# Patient Record
Sex: MALE | Race: WHITE | NOT HISPANIC OR LATINO | Employment: OTHER | URBAN - METROPOLITAN AREA
[De-identification: names, ages, dates, MRNs, and addresses within clinical notes are randomized per-mention and may not be internally consistent; named-entity substitution may affect disease eponyms.]

---

## 2017-03-17 ENCOUNTER — APPOINTMENT (OUTPATIENT)
Dept: PREADMISSION TESTING | Facility: HOSPITAL | Age: 62
End: 2017-03-17

## 2017-03-21 ENCOUNTER — OFFICE VISIT (OUTPATIENT)
Dept: CARDIOLOGY | Facility: CLINIC | Age: 62
End: 2017-03-21

## 2017-03-21 VITALS
HEIGHT: 71 IN | DIASTOLIC BLOOD PRESSURE: 85 MMHG | SYSTOLIC BLOOD PRESSURE: 161 MMHG | WEIGHT: 168.4 LBS | HEART RATE: 82 BPM | BODY MASS INDEX: 23.57 KG/M2

## 2017-03-21 DIAGNOSIS — E78.2 MIXED HYPERLIPIDEMIA: ICD-10-CM

## 2017-03-21 DIAGNOSIS — I10 ESSENTIAL HYPERTENSION: ICD-10-CM

## 2017-03-21 DIAGNOSIS — I25.10 CORONARY ARTERY DISEASE INVOLVING NATIVE CORONARY ARTERY OF NATIVE HEART WITHOUT ANGINA PECTORIS: Primary | ICD-10-CM

## 2017-03-21 PROCEDURE — 93000 ELECTROCARDIOGRAM COMPLETE: CPT | Performed by: INTERNAL MEDICINE

## 2017-03-21 PROCEDURE — 99213 OFFICE O/P EST LOW 20 MIN: CPT | Performed by: INTERNAL MEDICINE

## 2017-03-21 RX ORDER — CLOPIDOGREL BISULFATE 75 MG/1
75 TABLET ORAL DAILY
COMMUNITY
End: 2017-03-21 | Stop reason: SDUPTHER

## 2017-03-21 RX ORDER — CLOPIDOGREL BISULFATE 75 MG/1
75 TABLET ORAL DAILY
Qty: 90 TABLET | Refills: 3 | Status: SHIPPED | OUTPATIENT
Start: 2017-03-21 | End: 2018-04-25 | Stop reason: SDUPTHER

## 2017-03-21 RX ORDER — CARVEDILOL 6.25 MG/1
6.25 TABLET ORAL EVERY 12 HOURS SCHEDULED
Qty: 180 TABLET | Refills: 3 | Status: SHIPPED | OUTPATIENT
Start: 2017-03-21 | End: 2018-06-01 | Stop reason: SDUPTHER

## 2017-03-21 RX ORDER — ATORVASTATIN CALCIUM 40 MG/1
80 TABLET, FILM COATED ORAL NIGHTLY
Qty: 90 TABLET | Refills: 3 | Status: SHIPPED | OUTPATIENT
Start: 2017-03-21 | End: 2017-09-07 | Stop reason: SDUPTHER

## 2017-03-21 NOTE — PROGRESS NOTES
Encounter Date:03/21/2017      Patient ID: Roberto Cobos is a 61 y.o. male.    Chief Complaint: Surgery Clearance    PROBLEM LIST:  1. Coronary artery disease  a. UA following cardiac catheter 8/16/16: 99% stenosis of QUETA branch of RCA with non-occlusive IntraStent of mid RCA.  Total occlusion of circumflex at previous stent.  Mid plaque approximately LAD and artery disease LAD.  EF 60%.  b. PTCA stent of the LAD of RCA with MARI.  c. Cardiac Catheterization 11/27/16: Patent stent in proximal LAD, 50% stenosis at the apical portion, diagnoses are free of disease.  Left circumflex chronically occluded proximally at the level of stent to the OM.  Left collaterals to obtuse marginal.  RCA 40% stenosis.  d. Echocardiogram 11/27/16 Normal diastolic function. EF 70-75%. No valvular problems noted.  2. Hypertension  3. Hyperlipidemia  4. Bladder cancer   a. Hospitalized at [facility] on 11/27/16 for hematuria.  b. Prostate cancer    History of Present Illness  Patient presents today for follow-up. Has been doing well from a cardiac standpoint. Due to undergo bladder surgery. Denies chest pain, shortness of breath, leg swelling, dizzy spells, palpitations, or syncope. Remains busy and active. Continues to smoke cigarettes.    Allergies   Allergen Reactions   • Sulfa Antibiotics Rash         Current Outpatient Prescriptions:   •  albuterol (PROVENTIL HFA;VENTOLIN HFA) 108 (90 BASE) MCG/ACT inhaler, Inhale 2 puffs 4 (four) times a day., Disp: 1 inhaler, Rfl: 11  •  aspirin 81 MG EC tablet, Take 81 mg by mouth Daily., Disp: , Rfl:   •  atorvastatin (LIPITOR) 40 MG tablet, Take 2 tablets by mouth Every Night., Disp: 90 tablet, Rfl: 3  •  carvedilol (COREG) 6.25 MG tablet, Take 1 tablet by mouth Every 12 (Twelve) Hours., Disp: 180 tablet, Rfl: 3  •  clopidogrel (PLAVIX) 75 MG tablet, Take 1 tablet by mouth Daily., Disp: 90 tablet, Rfl: 3  •  finasteride (PROSCAR) 5 MG tablet, Take 5 mg by mouth Daily., Disp: , Rfl:  "  •  HYDROcodone-acetaminophen (NORCO) 5-325 MG per tablet, Take 1 tablet by mouth Every 8 (Eight) Hours As Needed., Disp: , Rfl:   •  ipratropium-albuterol (DUO-NEB) 0.5-2.5 mg/mL nebulizer, Take 3 mL by nebulization 4 (four) times a day., Disp: 360 mL, Rfl: 0  •  lisinopril (PRINIVIL,ZESTRIL) 2.5 MG tablet, Take 2.5 mg by mouth Daily., Disp: , Rfl:   •  mometasone-formoterol (DULERA 100) 100-5 MCG/ACT inhaler, Inhale 2 puffs 2 (Two) Times a Day., Disp: , Rfl:   •  nitroglycerin (NITROSTAT) 0.4 MG SL tablet, Place 0.4 mg under the tongue every 5 (five) minutes as needed for chest pain. Take no more than 3 doses in 15 minutes., Disp: , Rfl:   •  oxybutynin (DITROPAN) 5 MG tablet, Take 5 mg by mouth 2 (Two) Times a Day., Disp: , Rfl:   •  pantoprazole (PROTONIX) 40 MG EC tablet, Take 40 mg by mouth Daily., Disp: , Rfl:   •  rOPINIRole (REQUIP) 0.25 MG tablet, Take 0.25 mg by mouth every night. Take 1 hour before bedtime., Disp: , Rfl:   •  tamsulosin (FLOMAX) 0.4 MG capsule 24 hr capsule, Take 1 capsule by mouth every night., Disp: , Rfl:     The following portions of the patient's history were reviewed and updated as appropriate: allergies, current medications, past family history, past medical history, past social history, past surgical history and problem list.    Review of Systems   Constitution: Negative for chills, fever, weight gain and weight loss.   Cardiovascular: Negative for chest pain, claudication, dyspnea on exertion, leg swelling, orthopnea, palpitations, paroxysmal nocturnal dyspnea and syncope.        No dizziness   Gastrointestinal: Negative for abdominal pain, constipation, diarrhea, nausea and vomiting.   Genitourinary:        No urinary symptoms   Neurological:        No symptoms of stroke.   All other systems reviewed and are negative.    Objective:     Blood pressure 161/85, pulse 82, height 71\" (180.3 cm), weight 168 lb 6.4 oz (76.4 kg).      Physical Exam   Constitutional: He appears " well-developed and well-nourished.   HENT:   HEENT exam unremarkable.   Neck: Neck supple. No JVD present.   No carotid bruits.   Cardiovascular: Normal rate, regular rhythm and normal heart sounds.    No murmur heard.  2 plus symmetric pulses.   Pulmonary/Chest: Breath sounds normal. He exhibits no tenderness.   Abdominal:   Abdomen benign.   Musculoskeletal: He exhibits no edema.   Neurological:   Neurological exam unremarkable.   Vitals reviewed.      ECG 12 Lead  Date/Time: 3/21/2017 11:41 AM  Performed by: LALO JACOME  Authorized by: LALO JACOME   Rhythm: sinus rhythm  Conduction: incomplete RBBB  Clinical impression: non-specific ECG               Assessment:      Diagnosis Plan   1. Coronary artery disease involving native coronary artery of native heart without angina pectoris     2. Essential hypertension     3. Mixed hyperlipidemia       Plan:   Cleared from a cardiac standpoint for his upcoming bladder procedure.  Continue current medications.   FU in 6 MO, sooner as needed.  Thank you for allowing us to participate in the care of your patient.     Lalo Jacome MD [facility]    Please note that portions of this note may have been completed with a voice recognition program. Efforts were made to edit the dictations, but occasionally words are mistranscribed.

## 2017-04-10 ENCOUNTER — APPOINTMENT (OUTPATIENT)
Dept: PREADMISSION TESTING | Facility: HOSPITAL | Age: 62
End: 2017-04-10

## 2017-04-10 VITALS — WEIGHT: 167.25 LBS | HEIGHT: 71 IN | BODY MASS INDEX: 23.41 KG/M2

## 2017-04-10 LAB
DEPRECATED RDW RBC AUTO: 45.2 FL (ref 37–54)
ERYTHROCYTE [DISTWIDTH] IN BLOOD BY AUTOMATED COUNT: 13.1 % (ref 11.3–14.5)
HCT VFR BLD AUTO: 41.9 % (ref 38.9–50.9)
HGB BLD-MCNC: 14 G/DL (ref 13.1–17.5)
MCH RBC QN AUTO: 31.3 PG (ref 27–31)
MCHC RBC AUTO-ENTMCNC: 33.4 G/DL (ref 32–36)
MCV RBC AUTO: 93.5 FL (ref 80–99)
PLATELET # BLD AUTO: 163 10*3/MM3 (ref 150–450)
PMV BLD AUTO: 9.6 FL (ref 6–12)
POTASSIUM BLD-SCNC: 4 MMOL/L (ref 3.5–5.5)
RBC # BLD AUTO: 4.48 10*6/MM3 (ref 4.2–5.76)
WBC NRBC COR # BLD: 6.03 10*3/MM3 (ref 3.5–10.8)

## 2017-04-10 PROCEDURE — 85027 COMPLETE CBC AUTOMATED: CPT | Performed by: ANESTHESIOLOGY

## 2017-04-10 PROCEDURE — 36415 COLL VENOUS BLD VENIPUNCTURE: CPT

## 2017-04-10 PROCEDURE — 84132 ASSAY OF SERUM POTASSIUM: CPT | Performed by: ANESTHESIOLOGY

## 2017-04-10 NOTE — DISCHARGE INSTRUCTIONS
The following information and instructions were given:    NPO after MN except sips of water with routine prescribed medication (except blood thinner, diabetes, or weight reducing medication) unless otherwise instructed by your physician.  Do not eat, drink, smoke or chew gum after MN the night before surgery. This also includes no mints.    DO NOT shave, wear makeup or dark nail polish.    Remove all jewelry (advised to go to jeweler if unable to remove).    Leave anything you consider valuable at home.    Leave your suitcase in the car until after your surgery.    Bring the following with you (if applicable)   -picture ID and insurance cards   -Co-pay/deductible required by insurance   -Medications in the original bottles (not a list) including all over-the-counter  medications if not brought to PAT   -Copy of advance directive, living will or power of  documents if not  brought to PAT   -CPAP or BIPAP mask and tubing (do not bring machine)   -Skin prep instructions sheet   -PAT Pass   Education booklet, brochure, handout or binder given to patient.    Pain Control After Surgery handout given to patient.    Respirex use (handout given to patient) and pneumonia prevention.    Signs and Symptoms of infection.    DVT Prevention stressing the importance of ambulation.

## 2017-04-12 ENCOUNTER — HOSPITAL ENCOUNTER (OUTPATIENT)
Facility: HOSPITAL | Age: 62
Setting detail: HOSPITAL OUTPATIENT SURGERY
Discharge: HOME | End: 2017-04-12
Attending: UROLOGY | Admitting: UROLOGY

## 2017-04-12 ENCOUNTER — ANESTHESIA (OUTPATIENT)
Dept: PERIOP | Facility: HOSPITAL | Age: 62
End: 2017-04-12

## 2017-04-12 ENCOUNTER — ANESTHESIA EVENT (OUTPATIENT)
Dept: PERIOP | Facility: HOSPITAL | Age: 62
End: 2017-04-12

## 2017-04-12 VITALS
HEART RATE: 69 BPM | RESPIRATION RATE: 14 BRPM | WEIGHT: 167 LBS | DIASTOLIC BLOOD PRESSURE: 97 MMHG | BODY MASS INDEX: 23.38 KG/M2 | OXYGEN SATURATION: 98 % | TEMPERATURE: 98 F | HEIGHT: 71 IN | SYSTOLIC BLOOD PRESSURE: 167 MMHG

## 2017-04-12 DIAGNOSIS — C67.9 BLADDER CANCER (HCC): ICD-10-CM

## 2017-04-12 PROCEDURE — 25010000002 FENTANYL CITRATE (PF) 100 MCG/2ML SOLUTION: Performed by: NURSE ANESTHETIST, CERTIFIED REGISTERED

## 2017-04-12 PROCEDURE — 88305 TISSUE EXAM BY PATHOLOGIST: CPT | Performed by: UROLOGY

## 2017-04-12 PROCEDURE — 25010000002 ONDANSETRON PER 1 MG: Performed by: NURSE ANESTHETIST, CERTIFIED REGISTERED

## 2017-04-12 PROCEDURE — 25010000002 DEXAMETHASONE PER 1 MG: Performed by: NURSE ANESTHETIST, CERTIFIED REGISTERED

## 2017-04-12 PROCEDURE — 25010000002 MIDAZOLAM PER 1 MG: Performed by: NURSE ANESTHETIST, CERTIFIED REGISTERED

## 2017-04-12 PROCEDURE — 25010000003 CEFAZOLIN IN DEXTROSE 2-4 GM/100ML-% SOLUTION: Performed by: UROLOGY

## 2017-04-12 PROCEDURE — 25010000002 PROPOFOL 10 MG/ML EMULSION: Performed by: NURSE ANESTHETIST, CERTIFIED REGISTERED

## 2017-04-12 RX ORDER — MIDAZOLAM HYDROCHLORIDE 1 MG/ML
INJECTION INTRAMUSCULAR; INTRAVENOUS AS NEEDED
Status: DISCONTINUED | OUTPATIENT
Start: 2017-04-12 | End: 2017-04-12 | Stop reason: SURG

## 2017-04-12 RX ORDER — CEFAZOLIN SODIUM 2 G/100ML
2 INJECTION, SOLUTION INTRAVENOUS ONCE
Status: COMPLETED | OUTPATIENT
Start: 2017-04-12 | End: 2017-04-12

## 2017-04-12 RX ORDER — SODIUM CHLORIDE, SODIUM LACTATE, POTASSIUM CHLORIDE, CALCIUM CHLORIDE 600; 310; 30; 20 MG/100ML; MG/100ML; MG/100ML; MG/100ML
9 INJECTION, SOLUTION INTRAVENOUS CONTINUOUS PRN
Status: DISCONTINUED | OUTPATIENT
Start: 2017-04-12 | End: 2017-04-12 | Stop reason: HOSPADM

## 2017-04-12 RX ORDER — SODIUM CHLORIDE 0.9 % (FLUSH) 0.9 %
1-10 SYRINGE (ML) INJECTION AS NEEDED
Status: DISCONTINUED | OUTPATIENT
Start: 2017-04-12 | End: 2017-04-12 | Stop reason: HOSPADM

## 2017-04-12 RX ORDER — DEXAMETHASONE SODIUM PHOSPHATE 10 MG/ML
INJECTION INTRAMUSCULAR; INTRAVENOUS AS NEEDED
Status: DISCONTINUED | OUTPATIENT
Start: 2017-04-12 | End: 2017-04-12 | Stop reason: SURG

## 2017-04-12 RX ORDER — HYDROMORPHONE HYDROCHLORIDE 1 MG/ML
0.5 INJECTION, SOLUTION INTRAMUSCULAR; INTRAVENOUS; SUBCUTANEOUS
Status: DISCONTINUED | OUTPATIENT
Start: 2017-04-12 | End: 2017-04-12 | Stop reason: HOSPADM

## 2017-04-12 RX ORDER — LIDOCAINE HYDROCHLORIDE 20 MG/ML
INJECTION, SOLUTION INFILTRATION; PERINEURAL AS NEEDED
Status: DISCONTINUED | OUTPATIENT
Start: 2017-04-12 | End: 2017-04-12 | Stop reason: SURG

## 2017-04-12 RX ORDER — MAGNESIUM HYDROXIDE 1200 MG/15ML
LIQUID ORAL AS NEEDED
Status: DISCONTINUED | OUTPATIENT
Start: 2017-04-12 | End: 2017-04-12 | Stop reason: HOSPADM

## 2017-04-12 RX ORDER — PROPOFOL 10 MG/ML
VIAL (ML) INTRAVENOUS AS NEEDED
Status: DISCONTINUED | OUTPATIENT
Start: 2017-04-12 | End: 2017-04-12 | Stop reason: SURG

## 2017-04-12 RX ORDER — FENTANYL CITRATE 50 UG/ML
50 INJECTION, SOLUTION INTRAMUSCULAR; INTRAVENOUS
Status: DISCONTINUED | OUTPATIENT
Start: 2017-04-12 | End: 2017-04-12 | Stop reason: HOSPADM

## 2017-04-12 RX ORDER — FAMOTIDINE 10 MG/ML
20 INJECTION, SOLUTION INTRAVENOUS
Status: DISCONTINUED | OUTPATIENT
Start: 2017-04-12 | End: 2017-04-12 | Stop reason: HOSPADM

## 2017-04-12 RX ORDER — ONDANSETRON 2 MG/ML
INJECTION INTRAMUSCULAR; INTRAVENOUS AS NEEDED
Status: DISCONTINUED | OUTPATIENT
Start: 2017-04-12 | End: 2017-04-12 | Stop reason: SURG

## 2017-04-12 RX ORDER — ONDANSETRON 2 MG/ML
4 INJECTION INTRAMUSCULAR; INTRAVENOUS ONCE AS NEEDED
Status: DISCONTINUED | OUTPATIENT
Start: 2017-04-12 | End: 2017-04-12 | Stop reason: HOSPADM

## 2017-04-12 RX ORDER — FENTANYL CITRATE 50 UG/ML
INJECTION, SOLUTION INTRAMUSCULAR; INTRAVENOUS AS NEEDED
Status: DISCONTINUED | OUTPATIENT
Start: 2017-04-12 | End: 2017-04-12 | Stop reason: SURG

## 2017-04-12 RX ORDER — LIDOCAINE HYDROCHLORIDE 10 MG/ML
0.5 INJECTION, SOLUTION EPIDURAL; INFILTRATION; INTRACAUDAL; PERINEURAL ONCE AS NEEDED
Status: COMPLETED | OUTPATIENT
Start: 2017-04-12 | End: 2017-04-12

## 2017-04-12 RX ORDER — CEPHALEXIN 250 MG/1
250 CAPSULE ORAL 3 TIMES DAILY
Qty: 15 CAPSULE | Refills: 0 | Status: SHIPPED | OUTPATIENT
Start: 2017-04-12 | End: 2017-04-22

## 2017-04-12 RX ORDER — FAMOTIDINE 20 MG/1
20 TABLET, FILM COATED ORAL
Status: DISCONTINUED | OUTPATIENT
Start: 2017-04-12 | End: 2017-04-12 | Stop reason: HOSPADM

## 2017-04-12 RX ADMIN — FENTANYL CITRATE 25 MCG: 50 INJECTION, SOLUTION INTRAMUSCULAR; INTRAVENOUS at 09:44

## 2017-04-12 RX ADMIN — PROPOFOL 200 MG: 10 INJECTION, EMULSION INTRAVENOUS at 09:24

## 2017-04-12 RX ADMIN — LIDOCAINE HYDROCHLORIDE 50 MG: 20 INJECTION, SOLUTION INFILTRATION; PERINEURAL at 09:23

## 2017-04-12 RX ADMIN — LIDOCAINE HYDROCHLORIDE 0.2 ML: 10 INJECTION, SOLUTION EPIDURAL; INFILTRATION; INTRACAUDAL; PERINEURAL at 08:11

## 2017-04-12 RX ADMIN — ONDANSETRON 4 MG: 2 INJECTION INTRAMUSCULAR; INTRAVENOUS at 09:34

## 2017-04-12 RX ADMIN — SODIUM CHLORIDE, POTASSIUM CHLORIDE, SODIUM LACTATE AND CALCIUM CHLORIDE 9 ML/HR: 600; 310; 30; 20 INJECTION, SOLUTION INTRAVENOUS at 08:11

## 2017-04-12 RX ADMIN — MIDAZOLAM HYDROCHLORIDE 2 MG: 1 INJECTION, SOLUTION INTRAMUSCULAR; INTRAVENOUS at 09:20

## 2017-04-12 RX ADMIN — CEFAZOLIN SODIUM 2 G: 2 INJECTION, SOLUTION INTRAVENOUS at 09:20

## 2017-04-12 RX ADMIN — FAMOTIDINE 20 MG: 20 TABLET ORAL at 08:34

## 2017-04-12 RX ADMIN — FENTANYL CITRATE 25 MCG: 50 INJECTION, SOLUTION INTRAMUSCULAR; INTRAVENOUS at 09:45

## 2017-04-12 RX ADMIN — FENTANYL CITRATE 25 MCG: 50 INJECTION, SOLUTION INTRAMUSCULAR; INTRAVENOUS at 09:43

## 2017-04-12 RX ADMIN — DEXAMETHASONE SODIUM PHOSPHATE 4 MG: 10 INJECTION INTRAMUSCULAR; INTRAVENOUS at 09:30

## 2017-04-12 NOTE — H&P
"[facility] Pre-op    Full history and physical note from office is up to date.  See office note attached.    ROS:  No fever, chills, rashes.  No CP, palps, +cardiac clearance.  No cough, wheeze, SOA    /95 (BP Location: Right arm, Patient Position: Lying)  Pulse 69  Temp 97.4 °F (36.3 °C) (Temporal Artery )   Resp 18  Ht 71\" (180.3 cm)  Wt 167 lb (75.8 kg)  SpO2 98%  BMI 23.29 kg/m2    CV:  S1S2 reg no murmur  Resp:  CTAB    IMM:  Influenza:  Yes 2016  Pneumococcal:  no  Tetanus:  no    Cancer Staging (if applicable)  Cancer Patient: __ yes __no __unknown__N/A; If yes, clinical stage T:__ N:__M:__, stage group or __N/A    Jinny Morris, APRN 4/12/2017 8:42 AM    "

## 2017-04-12 NOTE — ANESTHESIA POSTPROCEDURE EVALUATION
Patient: Roberto Cobos    Procedure Summary     Date Anesthesia Start Anesthesia Stop Room / Location    04/12/17 0920 0954  [facility] / [facility]       Procedure Diagnosis Surgeon Provider    CYSTOSCOPY WITH BLADDER BIOPSY AND FULGURATION (N/A Bladder) No diagnosis on file. MD Peter Mathis MD          Anesthesia Type: general  Last vitals  BP      Temp      Pulse     Resp      SpO2        Post Anesthesia Care and Evaluation    Patient location during evaluation: PACU  Patient participation: complete - patient participated  Level of consciousness: obtunded/minimal responses  Pain score: 0  Pain management: adequate  Airway patency: patent  Anesthetic complications: No anesthetic complications  PONV Status: none  Cardiovascular status: hemodynamically stable and acceptable  Respiratory status: nonlabored ventilation, acceptable and nasal cannula  Hydration status: acceptable

## 2017-04-12 NOTE — BRIEF OP NOTE
CYSTOSCOPY BLADDER BIOPSY  Procedure Note    Roberto Cobos  4/12/2017    Pre-op Diagnosis:    Bladder cancer  Post-op Diagnosis:      Same  Procedure/CPT® Codes:      Procedure(s):  CYSTOSCOPY WITH BLADDER BIOPSY AND FULGURATION    Surgeon(s):  Bernardino Ray MD    Anesthesia: General    Staff:   Circulator: Katya Marinelli RN  Scrub Person: Suyapa Chinchilla RN    Estimated Blood Loss: * No values recorded between 4/12/2017  9:19 AM and 4/12/2017  9:50 AM *  Urine Voided: * No values recorded between 4/12/2017  9:19 AM and 4/12/2017  9:50 AM *    Specimens:                2 small biopsy sent to path  ID Type Source Tests Collected by Time Destination   A :  Tissue Urinary Bladder TISSUE EXAM Bernardino Ray MD 4/12/2017 0955          Drains:     none       Findings: 1+ bladder trabeculation.  A small amount of erythema right bladder base    Complications: None, to recovery room stable      Bernardino Ray MD     Date: 4/12/2017  Time: 9:56 AM

## 2017-04-12 NOTE — ANESTHESIA PREPROCEDURE EVALUATION
Anesthesia Evaluation     Patient summary reviewed and Nursing notes reviewed   no history of anesthetic complications:  NPO Status: > 8 hours   Airway   Mallampati: II  TM distance: >3 FB  Neck ROM: full  no difficulty expected  Dental - normal exam     Pulmonary    (+) a smoker Current, COPD, decreased breath sounds,   Cardiovascular - normal exam  Exercise tolerance: good (4-7 METS)    ECG reviewed  Rhythm: regular  Rate: normal    (+) hypertension well controlled, CAD, cardiac stents Drug eluting stent more than 12 months ago dysrhythmias Atrial Fib, hyperlipidemia      Neuro/Psych- negative ROS  GI/Hepatic/Renal/Endo    (+)  GERD well controlled,     Musculoskeletal     Abdominal  - normal exam  (-) obese    Abdomen: soft.   Substance History      OB/GYN          Other   (+) arthritis                               Anesthesia Plan    ASA 3     general     intravenous induction   Anesthetic plan and risks discussed with patient.    Plan discussed with CRNA.

## 2017-04-12 NOTE — ANESTHESIA PROCEDURE NOTES
Airway  Urgency: elective    Airway not difficult    General Information and Staff    Patient location during procedure: OR    Indications and Patient Condition  Indications for airway management: airway protection    Preoxygenated: yes  Mask difficulty assessment: 1 - vent by mask    Final Airway Details  Final airway type: supraglottic airway      Successful airway: unique  Size 4    Number of attempts at approach: 1

## 2017-04-20 LAB
CYTO UR: NORMAL
LAB AP CASE REPORT: NORMAL
LAB AP CLINICAL INFORMATION: NORMAL
LAB AP DIAGNOSIS COMMENT: NORMAL
Lab: NORMAL
PATH REPORT.FINAL DX SPEC: NORMAL
PATH REPORT.GROSS SPEC: NORMAL

## 2017-06-30 ENCOUNTER — TELEPHONE (OUTPATIENT)
Dept: CARDIOLOGY | Facility: CLINIC | Age: 62
End: 2017-06-30

## 2017-08-25 ENCOUNTER — OFFICE VISIT (OUTPATIENT)
Dept: CARDIOLOGY | Facility: CLINIC | Age: 62
End: 2017-08-25

## 2017-08-25 VITALS
SYSTOLIC BLOOD PRESSURE: 142 MMHG | HEART RATE: 74 BPM | WEIGHT: 162.6 LBS | BODY MASS INDEX: 22.76 KG/M2 | HEIGHT: 71 IN | DIASTOLIC BLOOD PRESSURE: 76 MMHG

## 2017-08-25 DIAGNOSIS — I10 ESSENTIAL HYPERTENSION: ICD-10-CM

## 2017-08-25 DIAGNOSIS — Z72.0 TOBACCO ABUSE: ICD-10-CM

## 2017-08-25 DIAGNOSIS — I25.111 CORONARY ARTERY DISEASE INVOLVING NATIVE CORONARY ARTERY OF NATIVE HEART WITH ANGINA PECTORIS WITH DOCUMENTED SPASM (HCC): Primary | ICD-10-CM

## 2017-08-25 DIAGNOSIS — E78.2 MIXED HYPERLIPIDEMIA: ICD-10-CM

## 2017-08-25 PROCEDURE — 99214 OFFICE O/P EST MOD 30 MIN: CPT | Performed by: INTERNAL MEDICINE

## 2017-08-25 RX ORDER — VARENICLINE TARTRATE 1 MG/1
1 TABLET, FILM COATED ORAL 2 TIMES DAILY
Qty: 60 TABLET | Refills: 6 | Status: SHIPPED | OUTPATIENT
Start: 2017-08-25 | End: 2017-11-06

## 2017-08-25 RX ORDER — ISOSORBIDE MONONITRATE 30 MG/1
30 TABLET, EXTENDED RELEASE ORAL DAILY
Qty: 30 TABLET | Refills: 6 | Status: SHIPPED | OUTPATIENT
Start: 2017-08-25 | End: 2017-10-20 | Stop reason: SINTOL

## 2017-08-25 NOTE — PROGRESS NOTES
Encounter Date:08/25/2017      Patient ID: Roberto Cobos is a 61 y.o. male.    Chief Complaint: Coronary Artery Disease, Cardiac risk factors and Shortness of Breath    PROBLEM LIST:  1. Coronary artery disease  a. UA following cardiac catheter 8/16/16: 99% stenosis of QUETA branch of RCA with non-occlusive IntraStent of mid RCA.  Total occlusion of circumflex at previous stent.  Mid plaque approximately LAD and artery disease LAD.  EF 60%.  b. PTCA stent of the LAD of RCA with MARI.  c. Cardiac Catheterization 11/27/16: Patent stent in proximal LAD, 50% stenosis at the apical portion, diagnoses are free of disease.  Left circumflex chronically occluded proximally at the level of stent to the OM.  Left collaterals to obtuse marginal.  RCA 40% stenosis.  d. Echocardiogram 11/27/16 Normal diastolic function. EF 70-75%. No valvular problems noted.  2. Hypertension  3. Hyperlipidemia  4. Bladder cancer   a. Hospitalized at [facility] on 11/27/16 for hematuria.  5. Prostate cancer    History of Present Illness  Patient presents today for follow-up with a history of CAD and cardiac risk factors. Since last visit he has been experiencing occasional sharp left-sided chest pain which radiates into his left arm.  Also reports an episode of diaphoresis while just standing and talking to his friends which was associated with shortness of breath and subsequently he felt tired for a whole day however did not express any chest pain at that time. He also felt dizzy and sat down in fear that he was about to pass out.  Followed up with PCP and had multiple labs including vitamin levels and thyroid function tests which were all normal.  He is now scheduled for a CT scan to screen for lung cancer.  His bladder cancer is being closely monitored and has been stable. Continues to smoke cigarettes, 1.5 PPD. Desires to cut back, but feels it is very difficult. Drinks Sprite regularly, and admits to not drinking as much water as he  should.  He also has some problems with the cervical disc disease and is scheduled to undergo some procedures, may need eventual surgery and wants to know if he would be cleared.    Allergies   Allergen Reactions   • Sulfa Antibiotics Rash         Current Outpatient Prescriptions:   •  albuterol (PROVENTIL HFA;VENTOLIN HFA) 108 (90 BASE) MCG/ACT inhaler, Inhale 2 puffs 4 (four) times a day., Disp: 1 inhaler, Rfl: 11  •  aspirin 81 MG EC tablet, Take 81 mg by mouth Daily., Disp: , Rfl:   •  atorvastatin (LIPITOR) 40 MG tablet, Take 2 tablets by mouth Every Night., Disp: 90 tablet, Rfl: 3  •  carvedilol (COREG) 6.25 MG tablet, Take 1 tablet by mouth Every 12 (Twelve) Hours., Disp: 180 tablet, Rfl: 3  •  clopidogrel (PLAVIX) 75 MG tablet, Take 1 tablet by mouth Daily. (Patient taking differently: Take 75 mg by mouth Daily. Approval note to hold in epic letters 12/2016), Disp: 90 tablet, Rfl: 3  •  finasteride (PROSCAR) 5 MG tablet, Take 5 mg by mouth Daily., Disp: , Rfl:   •  ipratropium-albuterol (DUO-NEB) 0.5-2.5 mg/mL nebulizer, Take 3 mL by nebulization 4 (four) times a day., Disp: 360 mL, Rfl: 0  •  lisinopril (PRINIVIL,ZESTRIL) 2.5 MG tablet, Take 2.5 mg by mouth Daily., Disp: , Rfl:   •  mometasone-formoterol (DULERA 100) 100-5 MCG/ACT inhaler, Inhale 2 puffs 2 (Two) Times a Day., Disp: , Rfl:   •  nitroglycerin (NITROSTAT) 0.4 MG SL tablet, Place 0.4 mg under the tongue Every 5 (Five) Minutes As Needed for Chest Pain (has not needed). Take no more than 3 doses in 15 minutes. , Disp: , Rfl:   •  rOPINIRole (REQUIP) 0.25 MG tablet, Take 0.25 mg by mouth every night. Take 1 hour before bedtime., Disp: , Rfl:   •  tamsulosin (FLOMAX) 0.4 MG capsule 24 hr capsule, Take 1 capsule by mouth every night., Disp: , Rfl:     The following portions of the patient's history were reviewed and updated as appropriate: allergies, current medications, past family history, past medical history, past social history, past surgical  "history and problem list.    Review of Systems   Constitution: Positive for diaphoresis. Negative for chills, fever, weight gain and weight loss.   Cardiovascular: Positive for chest pain and dyspnea on exertion. Negative for claudication, leg swelling, orthopnea, palpitations, paroxysmal nocturnal dyspnea and syncope.        No dizziness   Respiratory: Positive for shortness of breath.    Gastrointestinal: Negative for abdominal pain, constipation, diarrhea, nausea and vomiting.   Genitourinary:        No urinary symptoms   Neurological: Positive for dizziness and loss of balance.        No symptoms of stroke.   All other systems reviewed and are negative.    Objective:     Blood pressure 142/76, pulse 74, height 71\" (180.3 cm), weight 162 lb 9.6 oz (73.8 kg).      Physical Exam  Constitutional: She appears well-developed and well-nourished.   HENT:   HEENT exam unremarkable.   Neck: Neck supple. No JVD present.   No carotid bruits.   Cardiovascular: Normal rate, regular rhythm and normal heart sounds.    No murmur heard.  2 plus symmetric pulses.   Pulmonary/Chest: Breath sounds normal. Does not exhibit tenderness.   Abdominal:   Abdomen benign.   Musculoskeletal: Does not exhibit edema.   Neurological:   Neurological exam unremarkable.   Vitals reviewed.    Lab Review:   Procedures       Assessment:      Diagnosis Plan   1. Coronary artery disease involving native coronary artery of native heart with Occasional atypical chest pain which appears musculoskeletal versus stable angina.  The episode of diaphoresis and weakness appears consistent with dehydration as there was no chest pain at that time and this has not recurred.   Start Imdur 30 mg once daily to help with chest pain episodes   2. Essential hypertension, well controlled    3. Mixed hyperlipidemia, managed with Lipitor 40.    4. Tobacco abuse, desires to cut back and eventuallly stop completely.  Educated on smoking cessation.       Plan:   Educated on " smoking cessation. Start Chantix 1 mg twice daily.  Start Imdur 30 mg to be taken once daily.  Continue All other current medications.   Advised to drink more water and maintain a healthy lifestyle.  When it comes time for surgery we will reassess his condition at that time to see if he is cleared.  FU in 3 MO, sooner as needed.  Thank you for allowing us to participate in the care of your patient.     Scribed for Amarilis Jacome MD by Jeremiah Crum. 8/25/2017  11:07 AM    I, Amarilis Jacome MD, personally performed the services described in this documentation as scribed by the above named individual in my presence, and it is both accurate and complete.  8/25/2017  12:38 PM        Please note that portions of this note may have been completed with a voice recognition program. Efforts were made to edit the dictations, but occasionally words are mistranscribed.

## 2017-08-29 ENCOUNTER — TELEPHONE (OUTPATIENT)
Dept: CARDIOLOGY | Facility: CLINIC | Age: 62
End: 2017-08-29

## 2017-09-07 RX ORDER — ATORVASTATIN CALCIUM 80 MG/1
80 TABLET, FILM COATED ORAL NIGHTLY
Qty: 90 TABLET | Refills: 1 | Status: SHIPPED | OUTPATIENT
Start: 2017-09-07 | End: 2022-02-22 | Stop reason: ALTCHOICE

## 2017-10-17 ENCOUNTER — TELEPHONE (OUTPATIENT)
Dept: CARDIOLOGY | Facility: CLINIC | Age: 62
End: 2017-10-17

## 2017-10-20 RX ORDER — RANOLAZINE 500 MG/1
500 TABLET, EXTENDED RELEASE ORAL 2 TIMES DAILY
Qty: 60 TABLET | Refills: 5 | Status: SHIPPED | OUTPATIENT
Start: 2017-10-20 | End: 2018-01-26 | Stop reason: SDUPTHER

## 2017-11-06 ENCOUNTER — APPOINTMENT (OUTPATIENT)
Dept: PREADMISSION TESTING | Facility: HOSPITAL | Age: 62
End: 2017-11-06

## 2017-11-06 VITALS — HEIGHT: 71 IN | WEIGHT: 164.46 LBS | BODY MASS INDEX: 23.02 KG/M2

## 2017-11-06 LAB
ANION GAP SERPL CALCULATED.3IONS-SCNC: 4 MMOL/L (ref 3–11)
BUN BLD-MCNC: 19 MG/DL (ref 9–23)
BUN/CREAT SERPL: 15.8 (ref 7–25)
CALCIUM SPEC-SCNC: 9 MG/DL (ref 8.7–10.4)
CHLORIDE SERPL-SCNC: 108 MMOL/L (ref 99–109)
CO2 SERPL-SCNC: 29 MMOL/L (ref 20–31)
CREAT BLD-MCNC: 1.2 MG/DL (ref 0.6–1.3)
DEPRECATED RDW RBC AUTO: 44 FL (ref 37–54)
ERYTHROCYTE [DISTWIDTH] IN BLOOD BY AUTOMATED COUNT: 13.2 % (ref 11.3–14.5)
GFR SERPL CREATININE-BSD FRML MDRD: 61 ML/MIN/1.73
GLUCOSE BLD-MCNC: 148 MG/DL (ref 70–100)
HCT VFR BLD AUTO: 39.4 % (ref 38.9–50.9)
HGB BLD-MCNC: 13.4 G/DL (ref 13.1–17.5)
MCH RBC QN AUTO: 31.1 PG (ref 27–31)
MCHC RBC AUTO-ENTMCNC: 34 G/DL (ref 32–36)
MCV RBC AUTO: 91.4 FL (ref 80–99)
PLATELET # BLD AUTO: 136 10*3/MM3 (ref 150–450)
PMV BLD AUTO: 9.6 FL (ref 6–12)
POTASSIUM BLD-SCNC: 4 MMOL/L (ref 3.5–5.5)
RBC # BLD AUTO: 4.31 10*6/MM3 (ref 4.2–5.76)
SODIUM BLD-SCNC: 141 MMOL/L (ref 132–146)
WBC NRBC COR # BLD: 5.28 10*3/MM3 (ref 3.5–10.8)

## 2017-11-06 PROCEDURE — 93005 ELECTROCARDIOGRAM TRACING: CPT

## 2017-11-06 PROCEDURE — 85027 COMPLETE CBC AUTOMATED: CPT | Performed by: UROLOGY

## 2017-11-06 PROCEDURE — 93010 ELECTROCARDIOGRAM REPORT: CPT | Performed by: INTERNAL MEDICINE

## 2017-11-06 PROCEDURE — 36415 COLL VENOUS BLD VENIPUNCTURE: CPT

## 2017-11-06 PROCEDURE — 80048 BASIC METABOLIC PNL TOTAL CA: CPT | Performed by: UROLOGY

## 2017-11-06 NOTE — PAT
plt (136) count called to claudia at Dr cantu office, states will want redraw of cbc for morning labs. Placing redraw order at this time.

## 2017-11-08 ENCOUNTER — ANESTHESIA (OUTPATIENT)
Dept: PERIOP | Facility: HOSPITAL | Age: 62
End: 2017-11-08

## 2017-11-08 ENCOUNTER — HOSPITAL ENCOUNTER (OUTPATIENT)
Facility: HOSPITAL | Age: 62
Discharge: HOME | End: 2017-11-09
Attending: UROLOGY | Admitting: UROLOGY

## 2017-11-08 ENCOUNTER — ANESTHESIA EVENT (OUTPATIENT)
Dept: PERIOP | Facility: HOSPITAL | Age: 62
End: 2017-11-08

## 2017-11-08 DIAGNOSIS — C67.9 BLADDER CANCER (HCC): ICD-10-CM

## 2017-11-08 LAB
BASOPHILS # BLD AUTO: 0.04 10*3/MM3 (ref 0–0.2)
BASOPHILS NFR BLD AUTO: 0.7 % (ref 0–1)
DEPRECATED RDW RBC AUTO: 45.8 FL (ref 37–54)
EOSINOPHIL # BLD AUTO: 0.24 10*3/MM3 (ref 0–0.3)
EOSINOPHIL NFR BLD AUTO: 4.2 % (ref 0–3)
ERYTHROCYTE [DISTWIDTH] IN BLOOD BY AUTOMATED COUNT: 13.7 % (ref 11.3–14.5)
HCT VFR BLD AUTO: 40.8 % (ref 38.9–50.9)
HGB BLD-MCNC: 13.9 G/DL (ref 13.1–17.5)
IMM GRANULOCYTES # BLD: 0.01 10*3/MM3 (ref 0–0.03)
IMM GRANULOCYTES NFR BLD: 0.2 % (ref 0–0.6)
LYMPHOCYTES # BLD AUTO: 1.25 10*3/MM3 (ref 0.6–4.8)
LYMPHOCYTES NFR BLD AUTO: 21.9 % (ref 24–44)
MCH RBC QN AUTO: 31.2 PG (ref 27–31)
MCHC RBC AUTO-ENTMCNC: 34.1 G/DL (ref 32–36)
MCV RBC AUTO: 91.7 FL (ref 80–99)
MONOCYTES # BLD AUTO: 0.54 10*3/MM3 (ref 0–1)
MONOCYTES NFR BLD AUTO: 9.5 % (ref 0–12)
NEUTROPHILS # BLD AUTO: 3.62 10*3/MM3 (ref 1.5–8.3)
NEUTROPHILS NFR BLD AUTO: 63.5 % (ref 41–71)
PLATELET # BLD AUTO: 139 10*3/MM3 (ref 150–450)
PMV BLD AUTO: 9.9 FL (ref 6–12)
RBC # BLD AUTO: 4.45 10*6/MM3 (ref 4.2–5.76)
WBC NRBC COR # BLD: 5.7 10*3/MM3 (ref 3.5–10.8)

## 2017-11-08 PROCEDURE — 25010000002 DEXAMETHASONE PER 1 MG: Performed by: NURSE ANESTHETIST, CERTIFIED REGISTERED

## 2017-11-08 PROCEDURE — 25010000002 PROPOFOL 10 MG/ML EMULSION: Performed by: NURSE ANESTHETIST, CERTIFIED REGISTERED

## 2017-11-08 PROCEDURE — 25010000002 FENTANYL CITRATE (PF) 100 MCG/2ML SOLUTION: Performed by: NURSE ANESTHETIST, CERTIFIED REGISTERED

## 2017-11-08 PROCEDURE — 25010000002 HYDROMORPHONE PER 4 MG: Performed by: NURSE ANESTHETIST, CERTIFIED REGISTERED

## 2017-11-08 PROCEDURE — 25010000002 PHENYLEPHRINE PER 1 ML: Performed by: NURSE ANESTHETIST, CERTIFIED REGISTERED

## 2017-11-08 PROCEDURE — 25010000002 ONDANSETRON PER 1 MG: Performed by: NURSE ANESTHETIST, CERTIFIED REGISTERED

## 2017-11-08 PROCEDURE — 25010000003 CEFAZOLIN IN DEXTROSE 2-4 GM/100ML-% SOLUTION: Performed by: UROLOGY

## 2017-11-08 PROCEDURE — G0378 HOSPITAL OBSERVATION PER HR: HCPCS

## 2017-11-08 PROCEDURE — 94799 UNLISTED PULMONARY SVC/PX: CPT

## 2017-11-08 PROCEDURE — 85025 COMPLETE CBC W/AUTO DIFF WBC: CPT | Performed by: UROLOGY

## 2017-11-08 PROCEDURE — 88307 TISSUE EXAM BY PATHOLOGIST: CPT | Performed by: UROLOGY

## 2017-11-08 PROCEDURE — 25010000002 PROMETHAZINE PER 50 MG: Performed by: ANESTHESIOLOGY

## 2017-11-08 PROCEDURE — 94640 AIRWAY INHALATION TREATMENT: CPT

## 2017-11-08 RX ORDER — ONDANSETRON 2 MG/ML
INJECTION INTRAMUSCULAR; INTRAVENOUS AS NEEDED
Status: DISCONTINUED | OUTPATIENT
Start: 2017-11-08 | End: 2017-11-08 | Stop reason: SURG

## 2017-11-08 RX ORDER — SODIUM CHLORIDE 9 MG/ML
150 INJECTION, SOLUTION INTRAVENOUS CONTINUOUS
Status: DISCONTINUED | OUTPATIENT
Start: 2017-11-08 | End: 2017-11-09 | Stop reason: HOSPADM

## 2017-11-08 RX ORDER — ONDANSETRON 2 MG/ML
4 INJECTION INTRAMUSCULAR; INTRAVENOUS ONCE AS NEEDED
Status: COMPLETED | OUTPATIENT
Start: 2017-11-08 | End: 2017-11-08

## 2017-11-08 RX ORDER — RANOLAZINE 500 MG/1
500 TABLET, EXTENDED RELEASE ORAL EVERY 12 HOURS SCHEDULED
Status: DISCONTINUED | OUTPATIENT
Start: 2017-11-08 | End: 2017-11-09 | Stop reason: HOSPADM

## 2017-11-08 RX ORDER — LIDOCAINE HYDROCHLORIDE 10 MG/ML
0.5 INJECTION, SOLUTION EPIDURAL; INFILTRATION; INTRACAUDAL; PERINEURAL ONCE AS NEEDED
Status: DISCONTINUED | OUTPATIENT
Start: 2017-11-08 | End: 2017-11-08 | Stop reason: HOSPADM

## 2017-11-08 RX ORDER — FAMOTIDINE 20 MG/1
20 TABLET, FILM COATED ORAL ONCE
Status: DISCONTINUED | OUTPATIENT
Start: 2017-11-08 | End: 2017-11-08 | Stop reason: HOSPADM

## 2017-11-08 RX ORDER — DEXAMETHASONE SODIUM PHOSPHATE 4 MG/ML
INJECTION, SOLUTION INTRA-ARTICULAR; INTRALESIONAL; INTRAMUSCULAR; INTRAVENOUS; SOFT TISSUE AS NEEDED
Status: DISCONTINUED | OUTPATIENT
Start: 2017-11-08 | End: 2017-11-08 | Stop reason: SURG

## 2017-11-08 RX ORDER — MAGNESIUM HYDROXIDE 1200 MG/15ML
LIQUID ORAL AS NEEDED
Status: DISCONTINUED | OUTPATIENT
Start: 2017-11-08 | End: 2017-11-08 | Stop reason: HOSPADM

## 2017-11-08 RX ORDER — ASPIRIN 81 MG/1
81 TABLET ORAL DAILY
Status: DISCONTINUED | OUTPATIENT
Start: 2017-11-09 | End: 2017-11-09 | Stop reason: HOSPADM

## 2017-11-08 RX ORDER — ROPINIROLE 0.5 MG/1
0.25 TABLET, FILM COATED ORAL NIGHTLY
Status: DISCONTINUED | OUTPATIENT
Start: 2017-11-08 | End: 2017-11-09 | Stop reason: HOSPADM

## 2017-11-08 RX ORDER — IPRATROPIUM BROMIDE AND ALBUTEROL SULFATE 2.5; .5 MG/3ML; MG/3ML
3 SOLUTION RESPIRATORY (INHALATION)
Status: DISCONTINUED | OUTPATIENT
Start: 2017-11-08 | End: 2017-11-09 | Stop reason: HOSPADM

## 2017-11-08 RX ORDER — LISINOPRIL 2.5 MG/1
2.5 TABLET ORAL DAILY
Status: DISCONTINUED | OUTPATIENT
Start: 2017-11-09 | End: 2017-11-09 | Stop reason: HOSPADM

## 2017-11-08 RX ORDER — ATORVASTATIN CALCIUM 40 MG/1
80 TABLET, FILM COATED ORAL NIGHTLY
Status: DISCONTINUED | OUTPATIENT
Start: 2017-11-08 | End: 2017-11-09 | Stop reason: HOSPADM

## 2017-11-08 RX ORDER — LABETALOL HYDROCHLORIDE 5 MG/ML
5 INJECTION, SOLUTION INTRAVENOUS
Status: DISCONTINUED | OUTPATIENT
Start: 2017-11-08 | End: 2017-11-08

## 2017-11-08 RX ORDER — FINASTERIDE 5 MG/1
5 TABLET, FILM COATED ORAL DAILY
Status: DISCONTINUED | OUTPATIENT
Start: 2017-11-09 | End: 2017-11-09 | Stop reason: HOSPADM

## 2017-11-08 RX ORDER — CARVEDILOL 6.25 MG/1
6.25 TABLET ORAL EVERY 12 HOURS SCHEDULED
Status: DISCONTINUED | OUTPATIENT
Start: 2017-11-08 | End: 2017-11-09 | Stop reason: HOSPADM

## 2017-11-08 RX ORDER — HYDROCODONE BITARTRATE AND ACETAMINOPHEN 5; 325 MG/1; MG/1
1 TABLET ORAL EVERY 6 HOURS PRN
Status: DISCONTINUED | OUTPATIENT
Start: 2017-11-08 | End: 2017-11-08

## 2017-11-08 RX ORDER — EPHEDRINE SULFATE 50 MG/ML
5 INJECTION, SOLUTION INTRAVENOUS ONCE AS NEEDED
Status: DISCONTINUED | OUTPATIENT
Start: 2017-11-08 | End: 2017-11-08

## 2017-11-08 RX ORDER — HYDROCODONE BITARTRATE AND ACETAMINOPHEN 5; 325 MG/1; MG/1
1 TABLET ORAL EVERY 6 HOURS PRN
Status: DISCONTINUED | OUTPATIENT
Start: 2017-11-08 | End: 2017-11-09 | Stop reason: HOSPADM

## 2017-11-08 RX ORDER — FAMOTIDINE 10 MG/ML
20 INJECTION, SOLUTION INTRAVENOUS ONCE
Status: DISCONTINUED | OUTPATIENT
Start: 2017-11-08 | End: 2017-11-08 | Stop reason: HOSPADM

## 2017-11-08 RX ORDER — HYDROMORPHONE HYDROCHLORIDE 1 MG/ML
0.5 INJECTION, SOLUTION INTRAMUSCULAR; INTRAVENOUS; SUBCUTANEOUS
Status: COMPLETED | OUTPATIENT
Start: 2017-11-08 | End: 2017-11-08

## 2017-11-08 RX ORDER — NITROGLYCERIN 0.4 MG/1
0.4 TABLET SUBLINGUAL
Status: DISCONTINUED | OUTPATIENT
Start: 2017-11-08 | End: 2017-11-09 | Stop reason: HOSPADM

## 2017-11-08 RX ORDER — PROPOFOL 10 MG/ML
VIAL (ML) INTRAVENOUS AS NEEDED
Status: DISCONTINUED | OUTPATIENT
Start: 2017-11-08 | End: 2017-11-08 | Stop reason: SURG

## 2017-11-08 RX ORDER — SODIUM CHLORIDE, SODIUM LACTATE, POTASSIUM CHLORIDE, CALCIUM CHLORIDE 600; 310; 30; 20 MG/100ML; MG/100ML; MG/100ML; MG/100ML
9 INJECTION, SOLUTION INTRAVENOUS CONTINUOUS
Status: DISCONTINUED | OUTPATIENT
Start: 2017-11-08 | End: 2017-11-09 | Stop reason: HOSPADM

## 2017-11-08 RX ORDER — ROCURONIUM BROMIDE 10 MG/ML
INJECTION, SOLUTION INTRAVENOUS AS NEEDED
Status: DISCONTINUED | OUTPATIENT
Start: 2017-11-08 | End: 2017-11-08 | Stop reason: SURG

## 2017-11-08 RX ORDER — PROMETHAZINE HYDROCHLORIDE 25 MG/ML
6.25 INJECTION, SOLUTION INTRAMUSCULAR; INTRAVENOUS ONCE
Status: COMPLETED | OUTPATIENT
Start: 2017-11-08 | End: 2017-11-08

## 2017-11-08 RX ORDER — SODIUM CHLORIDE 0.9 % (FLUSH) 0.9 %
1-10 SYRINGE (ML) INJECTION AS NEEDED
Status: DISCONTINUED | OUTPATIENT
Start: 2017-11-08 | End: 2017-11-08 | Stop reason: HOSPADM

## 2017-11-08 RX ORDER — SODIUM CHLORIDE, SODIUM LACTATE, POTASSIUM CHLORIDE, CALCIUM CHLORIDE 600; 310; 30; 20 MG/100ML; MG/100ML; MG/100ML; MG/100ML
150 INJECTION, SOLUTION INTRAVENOUS CONTINUOUS
Status: DISCONTINUED | OUTPATIENT
Start: 2017-11-08 | End: 2017-11-09 | Stop reason: HOSPADM

## 2017-11-08 RX ORDER — LIDOCAINE HYDROCHLORIDE 10 MG/ML
INJECTION, SOLUTION EPIDURAL; INFILTRATION; INTRACAUDAL; PERINEURAL AS NEEDED
Status: DISCONTINUED | OUTPATIENT
Start: 2017-11-08 | End: 2017-11-08 | Stop reason: SURG

## 2017-11-08 RX ORDER — CEPHALEXIN 250 MG/1
250 CAPSULE ORAL 3 TIMES DAILY
Qty: 15 CAPSULE | Refills: 0 | Status: SHIPPED | OUTPATIENT
Start: 2017-11-08 | End: 2017-11-09

## 2017-11-08 RX ORDER — TAMSULOSIN HYDROCHLORIDE 0.4 MG/1
0.4 CAPSULE ORAL NIGHTLY
Status: DISCONTINUED | OUTPATIENT
Start: 2017-11-08 | End: 2017-11-09 | Stop reason: HOSPADM

## 2017-11-08 RX ORDER — HYDROCODONE BITARTRATE AND ACETAMINOPHEN 5; 325 MG/1; MG/1
1 TABLET ORAL EVERY 6 HOURS PRN
Qty: 20 TABLET | Refills: 0 | Status: SHIPPED | OUTPATIENT
Start: 2017-11-08 | End: 2017-11-09

## 2017-11-08 RX ORDER — CEFAZOLIN SODIUM 2 G/100ML
2 INJECTION, SOLUTION INTRAVENOUS ONCE
Status: COMPLETED | OUTPATIENT
Start: 2017-11-08 | End: 2017-11-08

## 2017-11-08 RX ORDER — FENTANYL CITRATE 50 UG/ML
INJECTION, SOLUTION INTRAMUSCULAR; INTRAVENOUS AS NEEDED
Status: DISCONTINUED | OUTPATIENT
Start: 2017-11-08 | End: 2017-11-08 | Stop reason: SURG

## 2017-11-08 RX ORDER — CEPHALEXIN 250 MG/1
250 CAPSULE ORAL EVERY 8 HOURS SCHEDULED
Status: DISCONTINUED | OUTPATIENT
Start: 2017-11-08 | End: 2017-11-09 | Stop reason: HOSPADM

## 2017-11-08 RX ORDER — FENTANYL CITRATE 50 UG/ML
25 INJECTION, SOLUTION INTRAMUSCULAR; INTRAVENOUS
Status: DISCONTINUED | OUTPATIENT
Start: 2017-11-08 | End: 2017-11-08

## 2017-11-08 RX ADMIN — HYDROCODONE BITARTRATE AND ACETAMINOPHEN 1 TABLET: 5; 325 TABLET ORAL at 18:41

## 2017-11-08 RX ADMIN — FENTANYL CITRATE 50 MCG: 50 INJECTION INTRAMUSCULAR; INTRAVENOUS at 10:49

## 2017-11-08 RX ADMIN — TAMSULOSIN HYDROCHLORIDE 0.4 MG: 0.4 CAPSULE ORAL at 20:19

## 2017-11-08 RX ADMIN — PHENYLEPHRINE HYDROCHLORIDE 100 MCG: 10 INJECTION INTRAVENOUS at 10:14

## 2017-11-08 RX ADMIN — ONDANSETRON 4 MG: 2 INJECTION INTRAMUSCULAR; INTRAVENOUS at 11:52

## 2017-11-08 RX ADMIN — DEXAMETHASONE SODIUM PHOSPHATE 8 MG: 4 INJECTION, SOLUTION INTRAMUSCULAR; INTRAVENOUS at 09:45

## 2017-11-08 RX ADMIN — CARVEDILOL 6.25 MG: 6.25 TABLET, FILM COATED ORAL at 20:19

## 2017-11-08 RX ADMIN — PROPOFOL 200 MG: 10 INJECTION, EMULSION INTRAVENOUS at 09:32

## 2017-11-08 RX ADMIN — ROPINIROLE 0.25 MG: 0.5 TABLET, FILM COATED ORAL at 20:18

## 2017-11-08 RX ADMIN — PHENYLEPHRINE HYDROCHLORIDE 100 MCG: 10 INJECTION INTRAVENOUS at 10:03

## 2017-11-08 RX ADMIN — CEFAZOLIN SODIUM 2 G: 2 INJECTION, SOLUTION INTRAVENOUS at 09:30

## 2017-11-08 RX ADMIN — HYDROMORPHONE HYDROCHLORIDE 0.5 MG: 1 INJECTION, SOLUTION INTRAMUSCULAR; INTRAVENOUS; SUBCUTANEOUS at 10:58

## 2017-11-08 RX ADMIN — PHENYLEPHRINE HYDROCHLORIDE 200 MCG: 10 INJECTION INTRAVENOUS at 09:55

## 2017-11-08 RX ADMIN — RANOLAZINE 500 MG: 500 TABLET, FILM COATED, EXTENDED RELEASE ORAL at 20:18

## 2017-11-08 RX ADMIN — FENTANYL CITRATE 100 MCG: 50 INJECTION, SOLUTION INTRAMUSCULAR; INTRAVENOUS at 09:32

## 2017-11-08 RX ADMIN — SODIUM CHLORIDE 150 ML/HR: 9 INJECTION, SOLUTION INTRAVENOUS at 18:41

## 2017-11-08 RX ADMIN — HYDROMORPHONE HYDROCHLORIDE 0.5 MG: 1 INJECTION, SOLUTION INTRAMUSCULAR; INTRAVENOUS; SUBCUTANEOUS at 11:05

## 2017-11-08 RX ADMIN — IPRATROPIUM BROMIDE AND ALBUTEROL SULFATE 3 ML: .5; 3 SOLUTION RESPIRATORY (INHALATION) at 20:26

## 2017-11-08 RX ADMIN — SUGAMMADEX 200 MG: 100 INJECTION, SOLUTION INTRAVENOUS at 10:21

## 2017-11-08 RX ADMIN — LIDOCAINE HYDROCHLORIDE 50 MG: 10 INJECTION, SOLUTION EPIDURAL; INFILTRATION; INTRACAUDAL; PERINEURAL at 09:32

## 2017-11-08 RX ADMIN — PROMETHAZINE HYDROCHLORIDE 3 MG: 25 INJECTION INTRAMUSCULAR; INTRAVENOUS at 13:08

## 2017-11-08 RX ADMIN — FENTANYL CITRATE 25 MCG: 50 INJECTION INTRAMUSCULAR; INTRAVENOUS at 10:40

## 2017-11-08 RX ADMIN — ATORVASTATIN CALCIUM 80 MG: 40 TABLET, FILM COATED ORAL at 20:18

## 2017-11-08 RX ADMIN — SODIUM CHLORIDE, POTASSIUM CHLORIDE, SODIUM LACTATE AND CALCIUM CHLORIDE: 600; 310; 30; 20 INJECTION, SOLUTION INTRAVENOUS at 09:29

## 2017-11-08 RX ADMIN — ONDANSETRON 4 MG: 2 INJECTION INTRAMUSCULAR; INTRAVENOUS at 10:20

## 2017-11-08 RX ADMIN — HYDROMORPHONE HYDROCHLORIDE 0.5 MG: 1 INJECTION, SOLUTION INTRAMUSCULAR; INTRAVENOUS; SUBCUTANEOUS at 11:15

## 2017-11-08 RX ADMIN — ROCURONIUM BROMIDE 50 MG: 10 INJECTION INTRAVENOUS at 09:32

## 2017-11-08 RX ADMIN — FENTANYL CITRATE 25 MCG: 50 INJECTION INTRAMUSCULAR; INTRAVENOUS at 10:43

## 2017-11-08 RX ADMIN — SODIUM CHLORIDE, POTASSIUM CHLORIDE, SODIUM LACTATE AND CALCIUM CHLORIDE 150 ML/HR: 600; 310; 30; 20 INJECTION, SOLUTION INTRAVENOUS at 20:43

## 2017-11-08 RX ADMIN — PHENYLEPHRINE HYDROCHLORIDE 100 MCG: 10 INJECTION INTRAVENOUS at 09:48

## 2017-11-08 RX ADMIN — PHENYLEPHRINE HYDROCHLORIDE 200 MCG: 10 INJECTION INTRAVENOUS at 09:51

## 2017-11-08 RX ADMIN — HYDROMORPHONE HYDROCHLORIDE 0.5 MG: 1 INJECTION, SOLUTION INTRAMUSCULAR; INTRAVENOUS; SUBCUTANEOUS at 10:46

## 2017-11-08 NOTE — ANESTHESIA POSTPROCEDURE EVALUATION
Patient: Roberto Cobos    Procedure Summary     Date Anesthesia Start Anesthesia Stop Room / Location    11/08/17 0929   [facility] / [facility]       Procedure Diagnosis Surgeon Provider    CYSTOSCOPY ,TRANSURETHRAL RESECTION OF BLADDER TUMOR AND FULGERATION (N/A Bladder) No diagnosis on file. MD Delta Mathis MD          Anesthesia Type: general  Last vitals  BP   159/98   Temp   97.5   Pulse   90   Resp   20   SpO2   98     Post Anesthesia Care and Evaluation    Patient location during evaluation: PACU  Patient participation: complete - patient participated  Level of consciousness: awake and alert  Pain score: 6  Pain management: adequate  Airway patency: patent  Anesthetic complications: No anesthetic complications  PONV Status: none  Cardiovascular status: hemodynamically stable and acceptable  Respiratory status: nonlabored ventilation, acceptable and nasal cannula  Hydration status: acceptable

## 2017-11-08 NOTE — H&P
Pre-Op H&P (See Recent Office Note scanned from 10/16/17)    Chief complaint: Bladder Cancer    HPI:      Patient is a 62 y.o. male who presents with malignant bladder tumor and here today for cystoscopy with possible TURBT    Review of Systems:  General ROS:  no fever, chills, rashes, No change since last office visit  Cardiovascular ROS: no chest pain or dyspnea on exertion.  +cardiac clearance  Respiratory ROS: no cough, shortness of breath, or wheezing    Immunization History:   Influenza:  no  Pneumococcal:  no  Tetanus:  unknown    Vital Signs:  /92 (BP Location: Right arm, Patient Position: Lying)  Pulse 72  Temp 97.4 °F (36.3 °C) (Temporal Artery )   Resp 18  SpO2 98%    Physical Exam:    CV:  S1S2 regular rate and rhythm, no murmur               Resp:  Clear to auscultation; respirations regular, even and unlabored    Results Review:    I reviewed the patient's new clinical results.    Cancer Staging (if applicable)  Cancer Patient: _x_ yes __no __unknown; If yes, clinical stage T:__ N:__M:__, stage group or __N/A    Assessment/Plan:  Malignant bladder tumor - Cystoscopy with possible transurethral resection of bladder tumor    Jinny Morris, APRN  11/8/2017   8:47 AM

## 2017-11-08 NOTE — ANESTHESIA PREPROCEDURE EVALUATION
Anesthesia Evaluation     Patient summary reviewed and Nursing notes reviewed   NPO Solid Status: > 8 hours  NPO Liquid Status: > 8 hours     Airway   Mallampati: II  TM distance: <3 FB  Neck ROM: full  Dental    (+) upper dentures    Pulmonary - normal exam   Cardiovascular   Exercise tolerance: good (4-7 METS)    Rhythm: regular  Rate: normal        Neuro/Psych  GI/Hepatic/Renal/Endo      Musculoskeletal     Abdominal    Substance History      OB/GYN          Other                                        Anesthesia Plan    ASA 2     general     intravenous induction   Anesthetic plan and risks discussed with patient.

## 2017-11-08 NOTE — PLAN OF CARE
Problem: Perioperative Period (Adult)  Goal: Signs and Symptoms of Listed Potential Problems Will be Absent or Manageable (Perioperative Period)  Outcome: Ongoing (interventions implemented as appropriate)    11/08/17 2745   Perioperative Period   Problems Assessed (Perioperative Period) urinary retention   Problems Present (Perioperative Period) urinary retention

## 2017-11-08 NOTE — OP NOTE
Pre-op diagnosis: Bladder cancer   postoperative diagnosis: Same  Procedure performed: Cystoscopy with transurethral resection of multiple bladder tumors (15) and fulguration   Anesthesia: Gen.  Surgeon: Flor  Complications: None  Drains: None  Indications: This is a 62-year-old white male with a history of bladder cancer dating back to 2007.  He had resections done in 07 in 2010 in 2013 and 2016 and then some fulguration and April 2017.  Recently he had a CT scan showed multiple small lesions in the bladder.  He hasn't had been asymptomatic with no hematuria  Operative description: Patient's postoperative table in the dorsolithotomy position general endotracheal anesthesia was a  groin was prepped and draped usual sterile fashion.  The 21 [place] [facility] panendoscope was inserted under video cystoscopy the urethra suspected no be normal.  The prostate shows no enlargement and is nonobstructing and bladder was entered.  Orifice ease when her normal location effluxing clear urine.  The bladder was inspected carefully with the 30 and 70° lenses.  There were multiple small papillary bladder tumors dispersed throughout the entire circumference of the bladder.  Approximately 15-16 tumors were noted.  I introduced the rigid cold cup biopsy forceps and then proceeded to resect approximately 14 or 15 of these.  Couldn't adequately reach the ones on the bladder dome with the cold cup biopsy.  I then removed the cystoscope and inserted the [facility] Tucker resectoscope with the rollerball.  I proceeded to cauterize the bases of each and every resection site.  I then cauterized the tumors that were at the dome that I could not adequately biopsied with the cold cup.  These were extensively fulgurated down to muscle fibers of the bladder at no time was any perforation of bladder noted.  Hemostasis was excellent the bladder straining the scope was removed atraumatically all the specimens were sent for pathologic examination.   2% Xylocaine gel was injected in the urethra and he was awakened taken recovery room in stable condition on no complications.  The hospital's dictation system is broken so this was done on a voice recognition system.  There may be significant transcription errors.

## 2017-11-08 NOTE — ANESTHESIA PROCEDURE NOTES
Airway  Urgency: elective    Airway not difficult    General Information and Staff    Patient location during procedure: OR  CRNA: LEOLA BOLAÑOS    Indications and Patient Condition  Indications for airway management: airway protection    Preoxygenated: yes  MILS not maintained throughout  Mask difficulty assessment: 2 - vent by mask + OA or adjuvant +/- NMBA    Final Airway Details  Final airway type: endotracheal airway      Successful airway: ETT  Cuffed: yes   Successful intubation technique: direct laryngoscopy  Facilitating devices/methods: intubating stylet  Endotracheal tube insertion site: oral  Blade: Barber  Blade size: #2  ETT size: 7.5 mm  Cormack-Lehane Classification: grade I - full view of glottis  Placement verified by: chest auscultation and capnometry   Cuff volume (mL): 8  Measured from: lips  ETT to lips (cm): 23  Number of attempts at approach: 1    Additional Comments  Negative epigastric sounds, Breath sound equal bilaterally with symmetric chest rise and fall

## 2017-11-08 NOTE — NURSING NOTE
Pt attemtped voiding several times and not able  Greenfield caTHETER placed at this time and he tolerated well.   Bloody return  md to be notified

## 2017-11-09 VITALS
SYSTOLIC BLOOD PRESSURE: 142 MMHG | DIASTOLIC BLOOD PRESSURE: 80 MMHG | HEIGHT: 71 IN | WEIGHT: 171 LBS | RESPIRATION RATE: 20 BRPM | HEART RATE: 92 BPM | BODY MASS INDEX: 23.94 KG/M2 | OXYGEN SATURATION: 94 % | TEMPERATURE: 97.8 F

## 2017-11-09 PROBLEM — C67.9 BLADDER CANCER: Status: ACTIVE | Noted: 2017-11-09

## 2017-11-09 LAB
ANION GAP SERPL CALCULATED.3IONS-SCNC: 4 MMOL/L (ref 3–11)
BUN BLD-MCNC: 21 MG/DL (ref 9–23)
BUN/CREAT SERPL: 17.5 (ref 7–25)
CALCIUM SPEC-SCNC: 8.3 MG/DL (ref 8.7–10.4)
CHLORIDE SERPL-SCNC: 111 MMOL/L (ref 99–109)
CO2 SERPL-SCNC: 26 MMOL/L (ref 20–31)
CREAT BLD-MCNC: 1.2 MG/DL (ref 0.6–1.3)
GFR SERPL CREATININE-BSD FRML MDRD: 61 ML/MIN/1.73
GLUCOSE BLD-MCNC: 146 MG/DL (ref 70–100)
HCT VFR BLD AUTO: 35.6 % (ref 38.9–50.9)
HGB BLD-MCNC: 12 G/DL (ref 13.1–17.5)
POTASSIUM BLD-SCNC: 4.6 MMOL/L (ref 3.5–5.5)
SODIUM BLD-SCNC: 141 MMOL/L (ref 132–146)

## 2017-11-09 PROCEDURE — 94760 N-INVAS EAR/PLS OXIMETRY 1: CPT

## 2017-11-09 PROCEDURE — G0378 HOSPITAL OBSERVATION PER HR: HCPCS

## 2017-11-09 PROCEDURE — 85018 HEMOGLOBIN: CPT | Performed by: UROLOGY

## 2017-11-09 PROCEDURE — 80048 BASIC METABOLIC PNL TOTAL CA: CPT | Performed by: UROLOGY

## 2017-11-09 PROCEDURE — 85014 HEMATOCRIT: CPT | Performed by: UROLOGY

## 2017-11-09 PROCEDURE — 94640 AIRWAY INHALATION TREATMENT: CPT

## 2017-11-09 RX ORDER — CEPHALEXIN 250 MG/1
250 CAPSULE ORAL 3 TIMES DAILY
Qty: 15 CAPSULE | Refills: 0 | Status: SHIPPED | OUTPATIENT
Start: 2017-11-09 | End: 2017-11-14

## 2017-11-09 RX ORDER — HYDROCODONE BITARTRATE AND ACETAMINOPHEN 5; 325 MG/1; MG/1
1 TABLET ORAL EVERY 6 HOURS PRN
Qty: 20 TABLET | Refills: 0
Start: 2017-11-09 | End: 2018-06-13

## 2017-11-09 RX ADMIN — HYDROCODONE BITARTRATE AND ACETAMINOPHEN 1 TABLET: 5; 325 TABLET ORAL at 00:28

## 2017-11-09 RX ADMIN — CARVEDILOL 6.25 MG: 6.25 TABLET, FILM COATED ORAL at 08:29

## 2017-11-09 RX ADMIN — CEPHALEXIN 250 MG: 250 CAPSULE ORAL at 05:28

## 2017-11-09 RX ADMIN — IPRATROPIUM BROMIDE AND ALBUTEROL SULFATE 3 ML: .5; 3 SOLUTION RESPIRATORY (INHALATION) at 07:27

## 2017-11-09 RX ADMIN — CEPHALEXIN 250 MG: 250 CAPSULE ORAL at 00:23

## 2017-11-09 RX ADMIN — FINASTERIDE 5 MG: 5 TABLET, FILM COATED ORAL at 08:29

## 2017-11-09 RX ADMIN — LISINOPRIL 2.5 MG: 2.5 TABLET ORAL at 08:29

## 2017-11-09 RX ADMIN — ASPIRIN 81 MG: 81 TABLET, COATED ORAL at 08:29

## 2017-11-09 RX ADMIN — RANOLAZINE 500 MG: 500 TABLET, FILM COATED, EXTENDED RELEASE ORAL at 08:32

## 2017-11-09 RX ADMIN — SODIUM CHLORIDE, POTASSIUM CHLORIDE, SODIUM LACTATE AND CALCIUM CHLORIDE 150 ML/HR: 600; 310; 30; 20 INJECTION, SOLUTION INTRAVENOUS at 03:23

## 2017-11-09 NOTE — PROGRESS NOTES
"Daily Progress Note    Patient: Roberto J [facility] Day: 2    Subjective: No complaints today.  He has no pain or discomfort.  No nausea or vomiting  Objective:     /73 (BP Location: Left arm, Patient Position: Lying)  Pulse 67  Temp 97.7 °F (36.5 °C) (Oral)   Resp 15  Ht 71\" (180.3 cm)  Wt 171 lb (77.6 kg)  SpO2 91%  BMI 23.85 kg/m2      Intake/Output Summary (Last 24 hours) at 11/09/17 0718  Last data filed at 11/09/17 0600   Gross per 24 hour   Intake             2160 ml   Output             3000 ml   Net             -840 ml       Review of Systems:    Physical Exam:   General Appearance: alert, appears stated age and cooperative  Head: normocephalic, without obvious abnormality and atraumatic  Lungs respirations regular  Abdomen no masses and soft non-tender  Male Genitalia circumcised Indwelling Greenfield catheter.  Urine is only lightly blood-tinged  Extremities moves extremities well, no edema, no cyanosis and no redness      Lab Results (last 24 hours)     Procedure Component Value Units Date/Time    CBC Auto Differential [[id number]]  (Abnormal) Collected:  11/08/17 0820    Specimen:  Blood Updated:  11/08/17 0837     WBC 5.70 10*3/mm3      RBC 4.45 10*6/mm3      Hemoglobin 13.9 g/dL      Hematocrit 40.8 %      MCV 91.7 fL      MCH 31.2 (H) pg      MCHC 34.1 g/dL      RDW 13.7 %      RDW-SD 45.8 fl      MPV 9.9 fL      Platelets 139 (L) 10*3/mm3      Neutrophil % 63.5 %      Lymphocyte % 21.9 (L) %      Monocyte % 9.5 %      Eosinophil % 4.2 (H) %      Basophil % 0.7 %      Immature Grans % 0.2 %      Neutrophils, Absolute 3.62 10*3/mm3      Lymphocytes, Absolute 1.25 10*3/mm3      Monocytes, Absolute 0.54 10*3/mm3      Eosinophils, Absolute 0.24 10*3/mm3      Basophils, Absolute 0.04 10*3/mm3      Immature Grans, Absolute 0.01 10*3/mm3     CBC & Differential [[id number]] Collected:  11/08/17 0820    Specimen:  Blood Updated:  11/08/17 0837    Narrative:       The following orders were created " for panel order CBC & Differential.  Procedure                               Abnormality         Status                     ---------                               -----------         ------                     CBC Auto Differential[[id number]]         Abnormal            Final result                 Please view results for these tests on the individual orders.    Tissue Pathology Exam - Tissue, Urinary Bladder [[id number]] Collected:  11/08/17 0951    Specimen:  Tissue from Urinary Bladder Updated:  11/08/17 1037    Hemoglobin & Hematocrit, Blood [[id number]]  (Abnormal) Collected:  11/09/17 0600    Specimen:  Blood Updated:  11/09/17 0642     Hemoglobin 12.0 (L) g/dL      Hematocrit 35.6 (L) %     Basic Metabolic Panel [[id number]]  (Abnormal) Collected:  11/09/17 0600    Specimen:  Blood Updated:  11/09/17 0716     Glucose 146 (H) mg/dL      BUN 21 mg/dL      Creatinine 1.20 mg/dL      Sodium 141 mmol/L      Potassium 4.6 mmol/L      Chloride 111 (H) mmol/L      CO2 26.0 mmol/L      Calcium 8.3 (L) mg/dL      eGFR Non African Amer 61 mL/min/1.73      BUN/Creatinine Ratio 17.5     Anion Gap 4.0 mmol/L     Narrative:       [employer] Guidelines    Stage     Description        GFR  1         Normal or High     90+  2         Mild decrease      60-89  3         Moderate decrease  30-59  4         Severe decrease    15-29  5         Kidney failure     <15            Assessment/Plan: Postoperative day #1 from transurethral resection of multiple bladder tumors.  Greenfield out this morning for voiding trial and then home without Greenfield catheter.  I discussed the plans of intravesical BCG therapy to begin next week.    Patient Active Problem List   Diagnosis   • Chest pain   • V-tach   • CAD (coronary artery disease)   • HTN (hypertension)   • Hyperlipidemia   • COPD (chronic obstructive pulmonary disease)   • Syncope, near   • Bladder cancer             Bernardino Ray MD - 11/9/2017, 7:18 AM

## 2017-11-09 NOTE — PLAN OF CARE
Problem: Perioperative Period (Adult)  Goal: Signs and Symptoms of Listed Potential Problems Will be Absent or Manageable (Perioperative Period)  Outcome: Ongoing (interventions implemented as appropriate)    11/09/17 0849   Perioperative Period   Problems Assessed (Perioperative Period) all   Problems Present (Perioperative Period) pain;physiologic stress response

## 2017-11-09 NOTE — PLAN OF CARE
Problem: Prostate, Transurethral Resection (Adult)  Goal: Signs and Symptoms of Listed Potential Problems Will be Absent or Manageable (Prostate, Transurethral Resection)  Outcome: Ongoing (interventions implemented as appropriate)    11/09/17 0329   Prostate, Transurethral Resection   Problems Assessed (Transurethral Resection of the Prostate) all   Problems Present (Transurethral Resection of the Prostate) voiding dysfunction

## 2017-11-10 LAB
CYTO UR: NORMAL
LAB AP CASE REPORT: NORMAL
LAB AP CLINICAL INFORMATION: NORMAL
Lab: NORMAL
PATH REPORT.FINAL DX SPEC: NORMAL
PATH REPORT.GROSS SPEC: NORMAL

## 2018-01-26 RX ORDER — RANOLAZINE 500 MG/1
500 TABLET, EXTENDED RELEASE ORAL EVERY 12 HOURS SCHEDULED
Qty: 180 TABLET | Refills: 1 | Status: SHIPPED | OUTPATIENT
Start: 2018-01-26 | End: 2020-08-21 | Stop reason: SDUPTHER

## 2018-04-25 RX ORDER — CLOPIDOGREL BISULFATE 75 MG/1
75 TABLET ORAL DAILY
Qty: 90 TABLET | Refills: 3 | Status: SHIPPED | OUTPATIENT
Start: 2018-04-25 | End: 2018-04-30 | Stop reason: SDUPTHER

## 2018-04-30 RX ORDER — CLOPIDOGREL BISULFATE 75 MG/1
TABLET ORAL
Qty: 90 TABLET | Refills: 0 | Status: SHIPPED | OUTPATIENT
Start: 2018-04-30 | End: 2019-10-14 | Stop reason: SDUPTHER

## 2018-06-01 RX ORDER — CARVEDILOL 6.25 MG/1
6.25 TABLET ORAL EVERY 12 HOURS SCHEDULED
Qty: 180 TABLET | Refills: 0 | Status: SHIPPED | OUTPATIENT
Start: 2018-06-01 | End: 2018-06-13 | Stop reason: SDUPTHER

## 2018-06-13 ENCOUNTER — OFFICE VISIT (OUTPATIENT)
Dept: CARDIOLOGY | Facility: CLINIC | Age: 63
End: 2018-06-13

## 2018-06-13 VITALS
HEIGHT: 71 IN | BODY MASS INDEX: 22.82 KG/M2 | WEIGHT: 163 LBS | DIASTOLIC BLOOD PRESSURE: 90 MMHG | SYSTOLIC BLOOD PRESSURE: 144 MMHG | HEART RATE: 76 BPM

## 2018-06-13 DIAGNOSIS — E78.2 MIXED HYPERLIPIDEMIA: ICD-10-CM

## 2018-06-13 DIAGNOSIS — I10 ESSENTIAL HYPERTENSION: ICD-10-CM

## 2018-06-13 DIAGNOSIS — I25.118 CORONARY ARTERY DISEASE OF NATIVE ARTERY OF NATIVE HEART WITH STABLE ANGINA PECTORIS (HCC): Primary | ICD-10-CM

## 2018-06-13 PROCEDURE — 99214 OFFICE O/P EST MOD 30 MIN: CPT | Performed by: INTERNAL MEDICINE

## 2018-06-13 RX ORDER — CARVEDILOL 12.5 MG/1
12.5 TABLET ORAL EVERY 12 HOURS SCHEDULED
Qty: 180 TABLET | Refills: 3 | Status: SHIPPED | OUTPATIENT
Start: 2018-06-13 | End: 2019-08-28 | Stop reason: SDUPTHER

## 2018-06-13 RX ORDER — LISINOPRIL 5 MG/1
5 TABLET ORAL DAILY
Qty: 90 TABLET | Refills: 3 | Status: SHIPPED | OUTPATIENT
Start: 2018-06-13 | End: 2019-08-14 | Stop reason: SDUPTHER

## 2018-06-13 NOTE — PROGRESS NOTES
Encounter Date:06/13/2018      Patient ID: Roberto Cobos is a 62 y.o. male.    Chief Complaint: Coronary Artery Disease      PROBLEM LIST:  1. Coronary artery disease  a. Cardiac cath 8/16/16: 99% stenosis of PLV branch of RCA with non-occlusive IntraStent plaque of mid RCA.  Total occlusion of circumflex at previous stent.  Mid plaque proximal LAD and moderate nonocclusive plaque of the apical segment of LAD, EF 60%.  b. PTCA stent of PLV branch of RCA with MARI August 16, 2016.  c. Echocardiogram 11/27/16 Normal diastolic function. EF 70-75%. No valvular problems noted.  2. Hypertension  3. Hyperlipidemia  4. Bladder cancer   a. Hospitalized at [facility] on 11/27/16 for hematuria.  5. Prostate cancer    History of Present Illness  Patient presents today for follow-up with a history of CAD and cardiac risk factors. Since last visit has done very well from cardiac standpoint however continues to have localized pain on the lt side of his chest. Received chemotherapy in [place] for the treatment of his bladder cancer, and his cancer is monitored by his oncologist in [place] now. Continues to smoke cigarettes daily. Denies chest pain, shortness of breath, leg swelling, palpitations, and syncope. Remains busy and active limited by palpitations when he exerts himself such as when he runs after his grandchildren.    Allergies   Allergen Reactions   • Sulfa Antibiotics Rash         Current Outpatient Prescriptions:   •  albuterol (PROVENTIL HFA;VENTOLIN HFA) 108 (90 BASE) MCG/ACT inhaler, Inhale 2 puffs 4 (four) times a day., Disp: 1 inhaler, Rfl: 11  •  aspirin 81 MG EC tablet, Take 81 mg by mouth Daily., Disp: , Rfl:   •  atorvastatin (LIPITOR) 80 MG tablet, Take 1 tablet by mouth Every Night., Disp: 90 tablet, Rfl: 1  •  carvedilol (COREG) 12.5 MG tablet, Take 1 tablet by mouth Every 12 (Twelve) Hours., Disp: 180 tablet, Rfl: 3  •  clopidogrel (PLAVIX) 75 MG tablet, TAKE 1 TABLET BY MOUTH EVERY DAY, Disp:  "90 tablet, Rfl: 0  •  ipratropium-albuterol (DUO-NEB) 0.5-2.5 mg/mL nebulizer, Take 3 mL by nebulization 4 (four) times a day., Disp: 360 mL, Rfl: 0  •  lisinopril (PRINIVIL,ZESTRIL) 5 MG tablet, Take 1 tablet by mouth Daily., Disp: 90 tablet, Rfl: 3  •  mometasone-formoterol (DULERA 100) 100-5 MCG/ACT inhaler, Inhale 2 puffs 2 (Two) Times a Day., Disp: , Rfl:   •  nitroglycerin (NITROSTAT) 0.4 MG SL tablet, Place 0.4 mg under the tongue Every 5 (Five) Minutes As Needed for Chest Pain (has not needed). Take no more than 3 doses in 15 minutes. , Disp: , Rfl:   •  ranolazine (RANEXA) 500 MG 12 hr tablet, Take 1 tablet by mouth Every 12 (Twelve) Hours., Disp: 180 tablet, Rfl: 1  •  rOPINIRole (REQUIP) 0.25 MG tablet, Take 0.25 mg by mouth every night. Take 1 hour before bedtime., Disp: , Rfl:   •  tamsulosin (FLOMAX) 0.4 MG capsule 24 hr capsule, Take 1 capsule by mouth every night., Disp: , Rfl:     The following portions of the patient's history were reviewed and updated as appropriate: allergies, current medications, past family history, past medical history, past social history, past surgical history and problem list.    ROS  Review of Systems   Constitution: Negative for chills, fever, weight gain and weight loss.   Cardiovascular: Negative for chest pain, claudication, dyspnea on exertion, leg swelling, orthopnea, palpitations, paroxysmal nocturnal dyspnea and syncope.        No dizziness   Gastrointestinal: Negative for abdominal pain, constipation, diarrhea, nausea and vomiting.   Genitourinary:        No urinary symptoms   Neurological:        No symptoms of stroke.   All other systems reviewed and are negative.    Objective:     Blood pressure 144/90, pulse 76, height 180.3 cm (71\"), weight 73.9 kg (163 lb).  Repeat blood pressure measurement by, Amarilis Jacome MD, at 150/90.      Physical Exam  Constitutional: She appears well-developed and well-nourished.   HENT:   HEENT exam unremarkable.   Neck: Neck " supple. No JVD present.   No carotid bruits.   Cardiovascular: Normal rate, regular rhythm and normal heart sounds.    No murmur heard.  2 plus symmetric pulses.   Pulmonary/Chest: Breath sounds normal. Does not exhibit tenderness.   Abdominal:   Abdomen benign.   Musculoskeletal: Does not exhibit edema.   Neurological:   Neurological exam unremarkable.   Vitals reviewed.    Lab Review:   Procedures       Assessment:      Diagnosis Plan   1. Coronary artery disease of native artery of native heart with stable angina pectoris  Stable, continue current medications.     2. Essential hypertension , Uncontrolled.   Increase carvedilol to 12.5 g twice a day, add lisinopril 5 mg daily.     3. Mixed hyperlipidemia       Plan:   Restart lisinopril 5 mg daily.   Increase Coreg from 6.25 to 12.5 mg Q12  Continue rest of the current medications.   FU in 6 MO in [facility], sooner as needed.  Thank you for allowing us to participate in the care of your patient.     Scribed for Amarilis Jacome MD by Kyler Crum. 6/13/2018  12:33 PM    I, Amarilis Jacome MD, personally performed the services described in this documentation as scribed by the above named individual in my presence, and it is both accurate and complete.  6/13/2018  12:33 PM        Please note that portions of this note may have been completed with a voice recognition program. Efforts were made to edit the dictations, but occasionally words are mistranscribed.

## 2018-06-25 ENCOUNTER — TELEPHONE (OUTPATIENT)
Dept: CARDIOLOGY | Facility: CLINIC | Age: 63
End: 2018-06-25

## 2018-08-08 ENCOUNTER — TELEPHONE (OUTPATIENT)
Dept: CARDIOLOGY | Facility: CLINIC | Age: 63
End: 2018-08-08

## 2018-08-08 NOTE — TELEPHONE ENCOUNTER
The patient's spouse called very concerned about a CT recently performed that indicated an abdominal aneurysm.  PCP has ordered an US to follow up with this and recommended moving up follow up appt with cardiology.  I asked that she have the reports of both the CT and US faxed to us, and advised that I will have his appt moved up to next available.  She is amenable to this.  All questions and concerns addressed at this time.  Understanding verbalized.

## 2019-03-15 ENCOUNTER — TELEPHONE (OUTPATIENT)
Dept: CARDIOLOGY | Facility: CLINIC | Age: 64
End: 2019-03-15

## 2019-03-15 NOTE — TELEPHONE ENCOUNTER
Cleared for bladder surgery from a cardiac standpoint.  Last drug-eluting stent was 2016.  He may hold Plavix for surgery he needs to follow-up with our office sometime after his surgery.

## 2019-03-15 NOTE — TELEPHONE ENCOUNTER
Patient needs cardiac clearance for bladder tumor removal to be scheduled.  They would like him to hold plavix prior.  He was cleared on 6/25/2018.  Since then patient called stating abnormal U/S showing possible AAA.  Patient states that was an incorrect diagnosis once scans were reviewed by PCP.  He does not have a f/u on file, and was told to call in at his convenience to setup and appt.    Please advise.    Dr. Alejandro Carr  [phone]

## 2019-05-18 ENCOUNTER — TELEPHONE (OUTPATIENT)
Dept: CARDIOLOGY | Facility: CLINIC | Age: 64
End: 2019-05-18

## 2019-05-18 DIAGNOSIS — I10 ESSENTIAL HYPERTENSION: ICD-10-CM

## 2019-05-18 DIAGNOSIS — I25.110 CORONARY ARTERY DISEASE INVOLVING NATIVE CORONARY ARTERY OF NATIVE HEART WITH UNSTABLE ANGINA PECTORIS (HCC): Primary | ICD-10-CM

## 2019-05-18 RX ORDER — AMLODIPINE BESYLATE 5 MG/1
5 TABLET ORAL DAILY
Qty: 90 TABLET | Refills: 3 | Status: SHIPPED | OUTPATIENT
Start: 2019-05-18 | End: 2021-05-04 | Stop reason: SDUPTHER

## 2019-05-18 NOTE — TELEPHONE ENCOUNTER
Patient presented to [facility] on 5/18/2019 with symptoms of unstable angina.  EKG and biomarker negative.  No beds available for transfer.      I recommended starting low-dose amlodipine (intolerant to long-acting nitrate) and cardiac catheterization early next week.  Patient would prefer to do it on Monday if possible per    TR

## 2019-05-20 ENCOUNTER — PREP FOR SURGERY (OUTPATIENT)
Dept: OTHER | Facility: HOSPITAL | Age: 64
End: 2019-05-20

## 2019-05-20 DIAGNOSIS — I20.9 ANGINA PECTORIS (HCC): Primary | ICD-10-CM

## 2019-05-20 RX ORDER — ASPIRIN 325 MG
325 TABLET, DELAYED RELEASE (ENTERIC COATED) ORAL DAILY
Status: CANCELLED | OUTPATIENT
Start: 2019-05-21

## 2019-05-20 RX ORDER — ASPIRIN 325 MG
325 TABLET ORAL ONCE
Status: CANCELLED | OUTPATIENT
Start: 2019-05-20 | End: 2019-05-20

## 2019-05-20 RX ORDER — SODIUM CHLORIDE 0.9 % (FLUSH) 0.9 %
3 SYRINGE (ML) INJECTION EVERY 12 HOURS SCHEDULED
Status: CANCELLED | OUTPATIENT
Start: 2019-05-20

## 2019-05-20 RX ORDER — SODIUM CHLORIDE 0.9 % (FLUSH) 0.9 %
3-10 SYRINGE (ML) INJECTION AS NEEDED
Status: CANCELLED | OUTPATIENT
Start: 2019-05-20

## 2019-05-20 RX ORDER — NITROGLYCERIN 0.4 MG/1
0.4 TABLET SUBLINGUAL
Status: CANCELLED | OUTPATIENT
Start: 2019-05-20

## 2019-05-20 RX ORDER — ONDANSETRON 2 MG/ML
4 INJECTION INTRAMUSCULAR; INTRAVENOUS EVERY 8 HOURS PRN
Status: CANCELLED | OUTPATIENT
Start: 2019-05-20

## 2019-05-22 ENCOUNTER — HOSPITAL ENCOUNTER (OUTPATIENT)
Facility: HOSPITAL | Age: 64
Discharge: HOME | End: 2019-05-22
Attending: INTERNAL MEDICINE | Admitting: INTERNAL MEDICINE

## 2019-05-22 VITALS
BODY MASS INDEX: 21.04 KG/M2 | OXYGEN SATURATION: 95 % | TEMPERATURE: 97.8 F | SYSTOLIC BLOOD PRESSURE: 132 MMHG | HEIGHT: 71 IN | DIASTOLIC BLOOD PRESSURE: 82 MMHG | RESPIRATION RATE: 18 BRPM | WEIGHT: 150.3 LBS | HEART RATE: 76 BPM

## 2019-05-22 DIAGNOSIS — I25.110 CORONARY ARTERY DISEASE INVOLVING NATIVE CORONARY ARTERY OF NATIVE HEART WITH UNSTABLE ANGINA PECTORIS (HCC): ICD-10-CM

## 2019-05-22 PROBLEM — Z72.0 TOBACCO ABUSE: Status: ACTIVE | Noted: 2019-05-22

## 2019-05-22 LAB
ACT BLD: <50 SECONDS (ref 82–152)
ALBUMIN SERPL-MCNC: 4.3 G/DL (ref 3.5–5.2)
ALBUMIN/GLOB SERPL: 1.5 G/DL
ALP SERPL-CCNC: 75 U/L (ref 39–117)
ALT SERPL W P-5'-P-CCNC: 12 U/L (ref 1–41)
ANION GAP SERPL CALCULATED.3IONS-SCNC: 11 MMOL/L
AST SERPL-CCNC: 14 U/L (ref 1–40)
BASOPHILS # BLD AUTO: 0.04 10*3/MM3 (ref 0–0.2)
BASOPHILS NFR BLD AUTO: 0.8 % (ref 0–1.5)
BILIRUB SERPL-MCNC: 0.4 MG/DL (ref 0.2–1.2)
BUN BLD-MCNC: 15 MG/DL (ref 8–23)
BUN/CREAT SERPL: 13.9 (ref 7–25)
CALCIUM SPEC-SCNC: 9.6 MG/DL (ref 8.6–10.5)
CHLORIDE SERPL-SCNC: 103 MMOL/L (ref 98–107)
CHOLEST SERPL-MCNC: 241 MG/DL (ref 0–200)
CO2 SERPL-SCNC: 28 MMOL/L (ref 22–29)
CREAT BLD-MCNC: 1.08 MG/DL (ref 0.76–1.27)
DEPRECATED RDW RBC AUTO: 42.3 FL (ref 37–54)
EOSINOPHIL # BLD AUTO: 0.23 10*3/MM3 (ref 0–0.4)
EOSINOPHIL NFR BLD AUTO: 4.6 % (ref 0.3–6.2)
ERYTHROCYTE [DISTWIDTH] IN BLOOD BY AUTOMATED COUNT: 12.9 % (ref 12.3–15.4)
GFR SERPL CREATININE-BSD FRML MDRD: 69 ML/MIN/1.73
GLOBULIN UR ELPH-MCNC: 2.8 GM/DL
GLUCOSE BLD-MCNC: 105 MG/DL (ref 65–99)
HBA1C MFR BLD: 5.5 % (ref 4.8–5.6)
HCT VFR BLD AUTO: 41.6 % (ref 37.5–51)
HDLC SERPL-MCNC: 33 MG/DL (ref 40–60)
HGB BLD-MCNC: 14.6 G/DL (ref 13–17.7)
IMM GRANULOCYTES # BLD AUTO: 0.01 10*3/MM3 (ref 0–0.05)
IMM GRANULOCYTES NFR BLD AUTO: 0.2 % (ref 0–0.5)
LDLC SERPL CALC-MCNC: 157 MG/DL (ref 0–100)
LDLC/HDLC SERPL: 4.76 {RATIO}
LYMPHOCYTES # BLD AUTO: 1.13 10*3/MM3 (ref 0.7–3.1)
LYMPHOCYTES NFR BLD AUTO: 22.6 % (ref 19.6–45.3)
MCH RBC QN AUTO: 31.7 PG (ref 26.6–33)
MCHC RBC AUTO-ENTMCNC: 35.1 G/DL (ref 31.5–35.7)
MCV RBC AUTO: 90.2 FL (ref 79–97)
MONOCYTES # BLD AUTO: 0.4 10*3/MM3 (ref 0.1–0.9)
MONOCYTES NFR BLD AUTO: 8 % (ref 5–12)
NEUTROPHILS # BLD AUTO: 3.19 10*3/MM3 (ref 1.7–7)
NEUTROPHILS NFR BLD AUTO: 63.8 % (ref 42.7–76)
PLATELET # BLD AUTO: 148 10*3/MM3 (ref 140–450)
PMV BLD AUTO: 10.1 FL (ref 6–12)
POTASSIUM BLD-SCNC: 4.1 MMOL/L (ref 3.5–5.2)
PROT SERPL-MCNC: 7.1 G/DL (ref 6–8.5)
RBC # BLD AUTO: 4.61 10*6/MM3 (ref 4.14–5.8)
SODIUM BLD-SCNC: 142 MMOL/L (ref 136–145)
TRIGL SERPL-MCNC: 254 MG/DL (ref 0–150)
VLDLC SERPL-MCNC: 50.8 MG/DL
WBC NRBC COR # BLD: 5 10*3/MM3 (ref 3.4–10.8)

## 2019-05-22 PROCEDURE — 92928 PRQ TCAT PLMT NTRAC ST 1 LES: CPT | Performed by: INTERNAL MEDICINE

## 2019-05-22 PROCEDURE — 25010000002 MIDAZOLAM PER 1 MG: Performed by: INTERNAL MEDICINE

## 2019-05-22 PROCEDURE — 63710000001 ACETAMINOPHEN 325 MG TABLET: Performed by: INTERNAL MEDICINE

## 2019-05-22 PROCEDURE — C1769 GUIDE WIRE: HCPCS | Performed by: INTERNAL MEDICINE

## 2019-05-22 PROCEDURE — C1874 STENT, COATED/COV W/DEL SYS: HCPCS | Performed by: INTERNAL MEDICINE

## 2019-05-22 PROCEDURE — C1725 CATH, TRANSLUMIN NON-LASER: HCPCS | Performed by: INTERNAL MEDICINE

## 2019-05-22 PROCEDURE — 80053 COMPREHEN METABOLIC PANEL: CPT | Performed by: INTERNAL MEDICINE

## 2019-05-22 PROCEDURE — 85347 COAGULATION TIME ACTIVATED: CPT

## 2019-05-22 PROCEDURE — 25010000002 HEPARIN (PORCINE) PER 1000 UNITS: Performed by: INTERNAL MEDICINE

## 2019-05-22 PROCEDURE — C9600 PERC DRUG-EL COR STENT SING: HCPCS | Performed by: INTERNAL MEDICINE

## 2019-05-22 PROCEDURE — 83036 HEMOGLOBIN GLYCOSYLATED A1C: CPT | Performed by: PHYSICIAN ASSISTANT

## 2019-05-22 PROCEDURE — A9270 NON-COVERED ITEM OR SERVICE: HCPCS | Performed by: INTERNAL MEDICINE

## 2019-05-22 PROCEDURE — S0260 H&P FOR SURGERY: HCPCS | Performed by: PHYSICIAN ASSISTANT

## 2019-05-22 PROCEDURE — C1887 CATHETER, GUIDING: HCPCS | Performed by: INTERNAL MEDICINE

## 2019-05-22 PROCEDURE — 80061 LIPID PANEL: CPT | Performed by: PHYSICIAN ASSISTANT

## 2019-05-22 PROCEDURE — 0 IOPAMIDOL PER 1 ML: Performed by: INTERNAL MEDICINE

## 2019-05-22 PROCEDURE — 36415 COLL VENOUS BLD VENIPUNCTURE: CPT

## 2019-05-22 PROCEDURE — 63710000001 CLOPIDOGREL 75 MG TABLET: Performed by: INTERNAL MEDICINE

## 2019-05-22 PROCEDURE — 93458 L HRT ARTERY/VENTRICLE ANGIO: CPT | Performed by: INTERNAL MEDICINE

## 2019-05-22 PROCEDURE — C1894 INTRO/SHEATH, NON-LASER: HCPCS | Performed by: INTERNAL MEDICINE

## 2019-05-22 PROCEDURE — 93005 ELECTROCARDIOGRAM TRACING: CPT | Performed by: INTERNAL MEDICINE

## 2019-05-22 PROCEDURE — 25010000002 FENTANYL CITRATE (PF) 100 MCG/2ML SOLUTION: Performed by: INTERNAL MEDICINE

## 2019-05-22 PROCEDURE — 85025 COMPLETE CBC W/AUTO DIFF WBC: CPT | Performed by: INTERNAL MEDICINE

## 2019-05-22 DEVICE — XIENCE SIERRA™ EVEROLIMUS ELUTING CORONARY STENT SYSTEM 3.00 MM X 38 MM / RAPID-EXCHANGE
Type: IMPLANTABLE DEVICE | Status: FUNCTIONAL
Brand: XIENCE SIERRA™

## 2019-05-22 RX ORDER — LIDOCAINE HYDROCHLORIDE 10 MG/ML
INJECTION, SOLUTION EPIDURAL; INFILTRATION; INTRACAUDAL; PERINEURAL AS NEEDED
Status: DISCONTINUED | OUTPATIENT
Start: 2019-05-22 | End: 2019-05-22 | Stop reason: HOSPADM

## 2019-05-22 RX ORDER — SODIUM CHLORIDE 0.9 % (FLUSH) 0.9 %
3 SYRINGE (ML) INJECTION EVERY 12 HOURS SCHEDULED
Status: DISCONTINUED | OUTPATIENT
Start: 2019-05-22 | End: 2019-05-22 | Stop reason: HOSPADM

## 2019-05-22 RX ORDER — SODIUM CHLORIDE 9 MG/ML
100 INJECTION, SOLUTION INTRAVENOUS CONTINUOUS
Status: ACTIVE | OUTPATIENT
Start: 2019-05-22 | End: 2019-05-22

## 2019-05-22 RX ORDER — ONDANSETRON 4 MG/1
4 TABLET, FILM COATED ORAL EVERY 6 HOURS PRN
Status: DISCONTINUED | OUTPATIENT
Start: 2019-05-22 | End: 2019-05-22 | Stop reason: HOSPADM

## 2019-05-22 RX ORDER — CLOPIDOGREL BISULFATE 75 MG/1
TABLET ORAL AS NEEDED
Status: DISCONTINUED | OUTPATIENT
Start: 2019-05-22 | End: 2019-05-22 | Stop reason: HOSPADM

## 2019-05-22 RX ORDER — HYDROCODONE BITARTRATE AND ACETAMINOPHEN 5; 325 MG/1; MG/1
1 TABLET ORAL EVERY 4 HOURS PRN
Status: DISCONTINUED | OUTPATIENT
Start: 2019-05-22 | End: 2019-05-22 | Stop reason: HOSPADM

## 2019-05-22 RX ORDER — ASPIRIN 325 MG
325 TABLET, DELAYED RELEASE (ENTERIC COATED) ORAL DAILY
Status: DISCONTINUED | OUTPATIENT
Start: 2019-05-23 | End: 2019-05-22 | Stop reason: HOSPADM

## 2019-05-22 RX ORDER — HEPARIN SODIUM 1000 [USP'U]/ML
INJECTION, SOLUTION INTRAVENOUS; SUBCUTANEOUS AS NEEDED
Status: DISCONTINUED | OUTPATIENT
Start: 2019-05-22 | End: 2019-05-22 | Stop reason: HOSPADM

## 2019-05-22 RX ORDER — ONDANSETRON 2 MG/ML
4 INJECTION INTRAMUSCULAR; INTRAVENOUS EVERY 8 HOURS PRN
Status: DISCONTINUED | OUTPATIENT
Start: 2019-05-22 | End: 2019-05-22 | Stop reason: HOSPADM

## 2019-05-22 RX ORDER — MIDAZOLAM HYDROCHLORIDE 1 MG/ML
INJECTION INTRAMUSCULAR; INTRAVENOUS AS NEEDED
Status: DISCONTINUED | OUTPATIENT
Start: 2019-05-22 | End: 2019-05-22 | Stop reason: HOSPADM

## 2019-05-22 RX ORDER — ASPIRIN 325 MG
325 TABLET ORAL ONCE
Status: COMPLETED | OUTPATIENT
Start: 2019-05-22 | End: 2019-05-22

## 2019-05-22 RX ORDER — ONDANSETRON 2 MG/ML
4 INJECTION INTRAMUSCULAR; INTRAVENOUS EVERY 6 HOURS PRN
Status: DISCONTINUED | OUTPATIENT
Start: 2019-05-22 | End: 2019-05-22 | Stop reason: HOSPADM

## 2019-05-22 RX ORDER — SODIUM CHLORIDE 0.9 % (FLUSH) 0.9 %
3-10 SYRINGE (ML) INJECTION AS NEEDED
Status: DISCONTINUED | OUTPATIENT
Start: 2019-05-22 | End: 2019-05-22 | Stop reason: HOSPADM

## 2019-05-22 RX ORDER — FENTANYL CITRATE 50 UG/ML
INJECTION, SOLUTION INTRAMUSCULAR; INTRAVENOUS AS NEEDED
Status: DISCONTINUED | OUTPATIENT
Start: 2019-05-22 | End: 2019-05-22 | Stop reason: HOSPADM

## 2019-05-22 RX ORDER — ACETAMINOPHEN 325 MG/1
650 TABLET ORAL EVERY 4 HOURS PRN
Status: DISCONTINUED | OUTPATIENT
Start: 2019-05-22 | End: 2019-05-22 | Stop reason: HOSPADM

## 2019-05-22 RX ORDER — SODIUM CHLORIDE 9 MG/ML
3 INJECTION, SOLUTION INTRAVENOUS CONTINUOUS
Status: DISCONTINUED | OUTPATIENT
Start: 2019-05-22 | End: 2019-05-22 | Stop reason: HOSPADM

## 2019-05-22 RX ORDER — NITROGLYCERIN 0.4 MG/1
0.4 TABLET SUBLINGUAL
Status: DISCONTINUED | OUTPATIENT
Start: 2019-05-22 | End: 2019-05-22 | Stop reason: HOSPADM

## 2019-05-22 RX ADMIN — SODIUM CHLORIDE 3 ML/KG/HR: 9 INJECTION, SOLUTION INTRAVENOUS at 08:04

## 2019-05-22 RX ADMIN — ACETAMINOPHEN 650 MG: 325 TABLET ORAL at 11:47

## 2019-05-22 RX ADMIN — ASPIRIN 325 MG ORAL TABLET 325 MG: 325 PILL ORAL at 08:04

## 2019-05-22 NOTE — H&P
[facility]        Date of Hospital Visit: 19      Place of Service: [facility]    Patient Name: Roberto Cobos  :1955      Primary Care Provider: Provider, No Known    Chief complaint/Reason for Consultation:  chest pain         Problem List:  Patient Active Problem List    Diagnosis    • *Coronary artery disease involving native coronary artery of native heart with unstable angina pectoris (CMS/[facility])      Priority: High     Note Last Updated: 2019     a. Cardiac cath 16: 99% stenosis of PLV branch of RCA with non-occlusive IntraStent plaque of mid RCA.  Total occlusion of circumflex at previous stent.  Mid plaque proximal LAD and moderate nonocclusive plaque of the apical segment of LAD, EF 60%.  b. PTCA stent of PLV branch of RCA with MARI 2016.  c. Echocardiogram 16 Normal diastolic function. EF 70-75%. No valvular problems noted.           • Essential hypertension      Priority: Medium   • Hyperlipidemia LDL goal <70      Priority: Medium   • Tobacco abuse      Priority: Low   • Bladder cancer (CMS/[facility])    • COPD (chronic obstructive pulmonary disease) (CMS/[facility])                History of Present Illness:  This is a 63-year-old hypertensive dyslipidemic male smoker with known coronary artery disease.  He presented to [facility] last weekend with severe midsternal chest pain with diaphoresis nausea radiating pain in left upper extremity.  MI was excluded and he was discharged home.  His wife reports that he had an elevated d-dimer.  He is continued to have fatigue and malaise with dyspnea and chest discomfort on minimal exertion.  He admits noncompliance with Ranexa.  He denies orthopnea, PND, claudication, lower extremity edema.  No awareness of tachyarrhythmias, no dizziness or syncope.  Continues to smoke a  pack of cigarettes per day.                Past Medical History:   Diagnosis Date   • A-fib (CMS/[facility])    •  Arthritis    • Back pain    • Bladder cancer (CMS/HCC)    • COPD (chronic obstructive pulmonary disease) (CMS/H[facility])    • Coronary artery disease     5 stents   • Dizziness    • Head ache    • Hyperlipidemia    • Hypertension    • Wears eyeglasses    • Wears partial dentures     upper plate only   • Wears partial dentures        Past Surgical History:   Procedure Laterality Date   • BACK SURGERY      cervical fusion    • BLADDER FULGURATION      mulitple   • CARDIAC CATHETERIZATION N/A 8/16/2016    Procedure: Left Heart Cath +/- CBI ;  Surgeon: Amarilis Jacome MD;  Location:  [facility];  Service:    • COLONOSCOPY      2017   • CORONARY STENT PLACEMENT      x4   • CYSTOSCOPY BLADDER BIOPSY N/A 4/12/2017    Procedure: CYSTOSCOPY WITH BLADDER BIOPSY AND FULGURATION;  Surgeon: Bernardino Ray MD;  Location:  [facility] OR;  Service:    • LUMBAR NERVE STIMLATOR INSERTION  2015   • TEETH EXTRACTION     • TRANSURETHRAL RESECTION OF BLADDER TUMOR N/A 11/8/2017    Procedure: CYSTOSCOPY, TRANSURETHRAL RESECTION OF BLADDER TUMOR AND FULGERATION;  Surgeon: Bernardino Ray MD;  Location:  [facility];  Service:        Allergies   Allergen Reactions   • Sulfa Antibiotics Rash       Medications Prior to Admission   Medication Sig Dispense Refill Last Dose   • albuterol (PROVENTIL HFA;VENTOLIN HFA) 108 (90 BASE) MCG/ACT inhaler Inhale 2 puffs 4 (four) times a day. 1 inhaler 11 5/22/2019 at Unknown time   • amLODIPine (NORVASC) 5 MG tablet Take 1 tablet by mouth Daily. 90 tablet 3 5/21/2019 at Unknown time   • aspirin 81 MG EC tablet Take 81 mg by mouth Daily.   5/21/2019 at Unknown time   • atorvastatin (LIPITOR) 80 MG tablet Take 1 tablet by mouth Every Night. 90 tablet 1 5/21/2019 at Unknown time   • carvedilol (COREG) 12.5 MG tablet Take 1 tablet by mouth Every 12 (Twelve) Hours. 180 tablet 3 5/21/2019 at Unknown time   • clopidogrel (PLAVIX) 75 MG tablet TAKE 1 TABLET BY MOUTH EVERY DAY 90 tablet 0 5/21/2019 at  Unknown time   • ipratropium-albuterol (DUO-NEB) 0.5-2.5 mg/mL nebulizer Take 3 mL by nebulization 4 (four) times a day. 360 mL 0 5/21/2019 at Unknown time   • lisinopril (PRINIVIL,ZESTRIL) 5 MG tablet Take 1 tablet by mouth Daily. 90 tablet 3 5/21/2019 at Unknown time   • mometasone-formoterol (DULERA 100) 100-5 MCG/ACT inhaler Inhale 2 puffs 2 (Two) Times a Day.   5/21/2019 at Unknown time   • ranolazine (RANEXA) 500 MG 12 hr tablet Take 1 tablet by mouth Every 12 (Twelve) Hours. 180 tablet 1 5/21/2019 at Unknown time   • rOPINIRole (REQUIP) 0.25 MG tablet Take 0.25 mg by mouth every night. Take 1 hour before bedtime.   5/21/2019 at Unknown time   • tamsulosin (FLOMAX) 0.4 MG capsule 24 hr capsule Take 1 capsule by mouth every night.   5/21/2019 at Unknown time   • nitroglycerin (NITROSTAT) 0.4 MG SL tablet Place 0.4 mg under the tongue Every 5 (Five) Minutes As Needed for Chest Pain (has not needed). Take no more than 3 doses in 15 minutes.    Unknown at Unknown time         Current Facility-Administered Medications:   •  [COMPLETED] aspirin tablet 325 mg, 325 mg, Oral, Once, 325 mg at 05/22/19 0804 **AND** [START ON 5/23/2019] aspirin EC tablet 325 mg, 325 mg, Oral, Daily, Mark Heard, PA  •  nitroglycerin (NITROSTAT) SL tablet 0.4 mg, 0.4 mg, Sublingual, Q5 Min PRN, Mark Heard, PA  •  ondansetron (ZOFRAN) injection 4 mg, 4 mg, Intravenous, Q8H PRN, Mark Heard, PA  •  sodium chloride 0.9 % flush 3 mL, 3 mL, Intravenous, Q12H, Mark Heard, PA  •  sodium chloride 0.9 % flush 3-10 mL, 3-10 mL, Intravenous, PRN, Mark Heard, PA  •  sodium chloride 0.9 % infusion, 3 mL/kg/hr, Intravenous, Continuous, Aslam, Amarilis, MD, Last Rate: 204.6 mL/hr at 05/22/19 0804, 3 mL/kg/hr at 05/22/19 0804      Social History     Socioeconomic History   • Marital status:      Spouse name: Not on file   • Number of children: Not on file   • Years of education: Not on file   • Highest education  "level: Not on file   Tobacco Use   • Smoking status: Current Every Day Smoker     Packs/day: 1.00     Years: 40.00     Pack years: 40.00     Types: Cigarettes   • Smokeless tobacco: Never Used   Substance and Sexual Activity   • Alcohol use: No   • Drug use: No   • Sexual activity: Defer       Family History   Problem Relation Age of Onset   • Heart disease Mother    • Hyperlipidemia Mother    • Stroke Mother    • Heart disease Father    • Hyperlipidemia Father        REVIEW OF SYSTEMS:   Review of Systems   Constitution: Negative.   HENT: Negative.    Eyes: Negative.    Cardiovascular: Positive for chest pain.   Respiratory: Negative.    Endocrine: Negative.    Hematologic/Lymphatic: Negative.    Skin: Negative.    Musculoskeletal: Negative.    Gastrointestinal: Negative.    Genitourinary: Negative.    Neurological: Negative.    Psychiatric/Behavioral: Negative.    Allergic/Immunologic: Negative.    All other systems reviewed and are negative.           Objective:  Vitals:    05/22/19 0730 05/22/19 0745   Pulse:  75   Resp:  16   Temp:  97.8 °F (36.6 °C)   TempSrc:  Temporal   SpO2:  99%   Weight: 68.2 kg (150 lb 4.8 oz)    Height: 180.3 cm (71\")      Body mass index is 20.96 kg/m².  Flowsheet Rows      First Filed Value   Admission Height  180.3 cm (71\") Documented at 05/22/2019 0730   Admission Weight  68.2 kg (150 lb 4.8 oz) Documented at 05/22/2019 0730        No intake or output data in the 24 hours ending 05/22/19 0811    Physical Exam   Constitutional: He is oriented to person, place, and time. He appears well-developed and well-nourished.   HENT:   Head: Normocephalic and atraumatic.   Mouth/Throat: Oropharynx is clear and moist.   Eyes: EOM are normal. Pupils are equal, round, and reactive to light. No scleral icterus.   Neck: Neck supple. No JVD present. No thyromegaly present.   Cardiovascular: Normal rate, regular rhythm and normal heart sounds. Exam reveals no gallop and no friction rub.   No murmur " heard.  Pulmonary/Chest: Breath sounds normal. No stridor. He has no wheezes. He has no rales.   Abdominal: Soft. Bowel sounds are normal. He exhibits no distension and no mass. There is no tenderness.   Musculoskeletal: Normal range of motion. He exhibits no edema, tenderness or deformity.   Lymphadenopathy:     He has no cervical adenopathy.   Neurological: He is alert and oriented to person, place, and time. No cranial nerve deficit. Coordination normal.   Skin: Skin is warm and dry. No rash noted. No erythema.   Psychiatric: He has a normal mood and affect.   Vitals reviewed.            Barbaeu Test:  Left: Normal  (oxymetric Allens) Right: Not Assessed     Lab Review:                                Assessment:   · Unstable angina  · Coronary artery disease  · Hypertension  · Dyslipidemia  · Ongoing tobacco abuse          Plan:   · Left heart catheterization possible catheter-based intervention              Scribed for Amarilis Jacome MD. by Electronically signed by DORI Roldan, 05/22/19, 8:12 AM.

## 2019-05-23 ENCOUNTER — CALL CENTER PROGRAMS (OUTPATIENT)
Dept: CALL CENTER | Facility: HOSPITAL | Age: 64
End: 2019-05-23

## 2019-05-23 NOTE — OUTREACH NOTE
PCI Survey      Responses   Facility patient discharged from?  [facility]   Procedure date  05/22/19   PCI site:  Left, Arm   Performing MD  Other (annotate) [Dr Jacome]   Attempt successful?  Yes   Call start time  0902   Call end time  0903   Is the patient taking prescribed medications:  ASA, Plavix   Nursing intervention  Reminded to continue to take prescribed medications   Nursing intervention  Patient education provided, Advised patient to call cardiology office   Does the patient have an appointment scheduled with the cardiologist?  Yes   Did the patient feel prepared to go home on the same day as the procedure?  Yes   Is the patient satisfied with the same day discharge process?  Yes   PCI call completed  Yes          Melo Wright RN

## 2019-06-04 ENCOUNTER — DOCUMENTATION (OUTPATIENT)
Dept: CARDIAC REHAB | Facility: HOSPITAL | Age: 64
End: 2019-06-04

## 2019-06-04 NOTE — PROGRESS NOTES
Pt. Referred for Phase II Cardiac Rehab. Staff discussed benefits of exercise, program protocol, and educational material provided. Teach back verified.  Permission granted from patient for staff to fax referral information to outlying program at this time.  Staff to fax referral info to [facility].

## 2019-06-17 ENCOUNTER — TELEPHONE (OUTPATIENT)
Dept: CARDIOLOGY | Facility: CLINIC | Age: 64
End: 2019-06-17

## 2019-06-17 DIAGNOSIS — R00.2 PALPITATIONS: Primary | ICD-10-CM

## 2019-06-17 NOTE — TELEPHONE ENCOUNTER
Family states they were supposed to be ordered a heart monitor after recent heart cath.  Would you like me to order a monitor?    Please advise.

## 2019-07-16 ENCOUNTER — OFFICE VISIT (OUTPATIENT)
Dept: CARDIOLOGY | Facility: CLINIC | Age: 64
End: 2019-07-16

## 2019-07-16 VITALS
HEART RATE: 75 BPM | WEIGHT: 163 LBS | SYSTOLIC BLOOD PRESSURE: 123 MMHG | BODY MASS INDEX: 22.82 KG/M2 | HEIGHT: 71 IN | DIASTOLIC BLOOD PRESSURE: 71 MMHG

## 2019-07-16 DIAGNOSIS — I10 ESSENTIAL HYPERTENSION: ICD-10-CM

## 2019-07-16 DIAGNOSIS — E78.5 HYPERLIPIDEMIA LDL GOAL <70: ICD-10-CM

## 2019-07-16 DIAGNOSIS — I25.110 CORONARY ARTERY DISEASE INVOLVING NATIVE CORONARY ARTERY OF NATIVE HEART WITH UNSTABLE ANGINA PECTORIS (HCC): Primary | ICD-10-CM

## 2019-07-16 DIAGNOSIS — Z72.0 TOBACCO ABUSE: ICD-10-CM

## 2019-07-16 PROCEDURE — 99214 OFFICE O/P EST MOD 30 MIN: CPT | Performed by: INTERNAL MEDICINE

## 2019-07-16 NOTE — PROGRESS NOTES
Encounter Date:07/16/2019      Patient ID: Roberto Cobos is a 63 y.o. male.    Chief Complaint: Coronary Artery Disease      PROBLEM LIST:  1. Coronary artery disease  a. Cardiac cath 8/16/16: 99% stenosis of PLV branch of RCA with non-occlusive IntraStent plaque of mid RCA.  Total occlusion of circumflex at previous stent.  Mid plaque proximal LAD and moderate nonocclusive plaque of the apical segment of LAD, EF 60%.  b. PTCA stent of PLV branch of RCA with MARI August 16, 2016.  c. Echocardiogram 11/27/16 Normal diastolic function. EF 70-75%. No valvular problems noted.  2. Hypertension  3. Hyperlipidemia  4. Bladder cancer   a. Hospitalized at [facility] on 11/27/16 for hematuria.  5. Prostate cancer  6. Tobacco abuse:  a. Down to 3 cigarettes per day on 7/16/2019 visit.    History of Present Illness  Patient presents today for follow-up with a history of coronary artery disease and cardiac risk factors. Since last visit, he has been doing well overall from a cardiovascular standpoint. He does report some left sided lateral chest discomfort. He has not had to take nitroglycerin for this. Patient admits he has not been taking atorvastatin 80 mg due to myalgias. He admits he still smokes, but has cut down to 3 cigarettes per day. He drinks a lot of caffeine, with about 3 cups of coffee in the morning. Denies chest pain, shortness of breath, leg swelling, palpitations, and syncope. Remains busy and active with no significant cardiac limitations.    Allergies   Allergen Reactions   • Sulfa Antibiotics Rash         Current Outpatient Medications:   •  albuterol (PROVENTIL HFA;VENTOLIN HFA) 108 (90 BASE) MCG/ACT inhaler, Inhale 2 puffs 4 (four) times a day., Disp: 1 inhaler, Rfl: 11  •  amLODIPine (NORVASC) 5 MG tablet, Take 1 tablet by mouth Daily., Disp: 90 tablet, Rfl: 3  •  aspirin 81 MG EC tablet, Take 81 mg by mouth Daily., Disp: , Rfl:   •  atorvastatin (LIPITOR) 80 MG tablet, Take 1 tablet by mouth  "Every Night., Disp: 90 tablet, Rfl: 1  •  carvedilol (COREG) 12.5 MG tablet, Take 1 tablet by mouth Every 12 (Twelve) Hours., Disp: 180 tablet, Rfl: 3  •  clopidogrel (PLAVIX) 75 MG tablet, TAKE 1 TABLET BY MOUTH EVERY DAY, Disp: 90 tablet, Rfl: 0  •  ipratropium-albuterol (DUO-NEB) 0.5-2.5 mg/mL nebulizer, Take 3 mL by nebulization 4 (four) times a day., Disp: 360 mL, Rfl: 0  •  lisinopril (PRINIVIL,ZESTRIL) 5 MG tablet, Take 1 tablet by mouth Daily., Disp: 90 tablet, Rfl: 3  •  mometasone-formoterol (DULERA 100) 100-5 MCG/ACT inhaler, Inhale 2 puffs 2 (Two) Times a Day., Disp: , Rfl:   •  nitroglycerin (NITROSTAT) 0.4 MG SL tablet, Place 0.4 mg under the tongue Every 5 (Five) Minutes As Needed for Chest Pain (has not needed). Take no more than 3 doses in 15 minutes. , Disp: , Rfl:   •  ranolazine (RANEXA) 500 MG 12 hr tablet, Take 1 tablet by mouth Every 12 (Twelve) Hours., Disp: 180 tablet, Rfl: 1  •  rOPINIRole (REQUIP) 0.25 MG tablet, Take 0.25 mg by mouth every night. Take 1 hour before bedtime., Disp: , Rfl:   •  tamsulosin (FLOMAX) 0.4 MG capsule 24 hr capsule, Take 1 capsule by mouth every night., Disp: , Rfl:     The following portions of the patient's history were reviewed and updated as appropriate: allergies, current medications, past family history, past medical history, past social history, past surgical history and problem list.    ROS  Review of Systems   Constitution: Negative for chills, fever, weight gain and weight loss.   Cardiovascular: Negative for chest pain, claudication, dyspnea on exertion, leg swelling, orthopnea, palpitations, paroxysmal nocturnal dyspnea and syncope.        No dizziness   Gastrointestinal: Negative for abdominal pain, constipation, diarrhea, nausea and vomiting.   Genitourinary:        No urinary symptoms   Neurological:        No symptoms of stroke.   All other systems reviewed and are negative.    Objective:     Blood pressure 123/71, pulse 75, height 180.3 cm (71\"), " weight 73.9 kg (163 lb).  Repeat blood pressure measurement by, Amarilis Jacome MD, at ***      Physical Exam  Constitutional: She appears well-developed and well-nourished.   HENT:   HEENT exam unremarkable.   Neck: Neck supple. No JVD present.   No carotid bruits.   Cardiovascular: Normal rate, regular rhythm and normal heart sounds.    No murmur heard.  2 plus symmetric pulses.   Pulmonary/Chest: Breath sounds normal. Does not exhibit tenderness.   Abdominal:   Abdomen benign.   Musculoskeletal: Does not exhibit edema.   Neurological:   Neurological exam unremarkable.   Vitals reviewed.    Lab Review:     Lab Results   Component Value Date    GLUCOSE 105 (H) 05/22/2019    BUN 15 05/22/2019    CREATININE 1.08 05/22/2019    EGFRIFNONA 69 05/22/2019    BCR 13.9 05/22/2019    K 4.1 05/22/2019    CO2 28.0 05/22/2019    CALCIUM 9.6 05/22/2019    ALBUMIN 4.30 05/22/2019    AST 14 05/22/2019    ALT 12 05/22/2019      Lab Results   Component Value Date    CHOL 241 (H) 05/22/2019    TRIG 254 (H) 05/22/2019    HDL 33 (L) 05/22/2019     (H) 05/22/2019     Lab Results   Component Value Date    WBC 5.00 05/22/2019    HGB 14.6 05/22/2019    HCT 41.6 05/22/2019    MCV 90.2 05/22/2019     05/22/2019      Lab Results   Component Value Date    HGBA1C 5.50 05/22/2019      Procedures       Assessment:      Diagnosis Plan   1. Coronary artery disease involving native coronary artery of native heart with unstable angina pectoris (CMS/[facility])     2. Essential hypertension     3. Hyperlipidemia LDL goal <70  Get back on atorvastatin 80 mg. FLP and CMP in 2 months.  OTC Vitamin D and CoQ 10 for myalgias.   4. Tobacco abuse       Plan:   Get back on atorvastatin 80 mg. FLP and CMP in 2 months.  OTC Vitamin D and CoQ 10 for myalgias.  Continue current medications.   FU in 6 MO, sooner as needed.  Thank you for allowing us to participate in the care of your patient.     ***    Please note that portions of this note may have been  completed with a voice recognition program. Efforts were made to edit the dictations, but occasionally words are mistranscribed.

## 2019-07-16 NOTE — PROGRESS NOTES
Encounter Date:07/16/2019        Patient ID: Roberto Cobos is a 63 y.o. male.    PCP: Provider, No Known     Chief Complaint: Coronary Artery Disease      PROBLEM LIST:  1. Coronary artery disease  a. [facility], 08/16/2016: 99% stenosis of PLV branch of RCA with non-occlusive IntraStent plaque of mid RCA. Total occlusion of circumflex at previous stent. Mid plaque proximal LAD and moderate nonocclusive plaque of the apical segment of LAD, EF 60%. PTCA stent of PLV branch of RCA with MARI.  b. Echocardiogram, 11/27/2016: Normal diastolic function. EF 70-75%. No valvular problems noted.  2. Cardiac arrhythmias:  a. 24-hour Holter June 18, 2019 showed sinus rhythm with occasional PVCs, very frequent APCs (26.9%) occasional PAT/SVT, no symptoms reported.  3. Hypertension  4. Hyperlipidemia  5. Bladder cancer   a. Hospitalized at [facility] on 11/27/16 for hematuria.  6. Prostate cancer  7. Tobacco abuse:  a. Down to 3 cigarettes per day on 7/16/2019 visit.    History of Present Illness  Patient presents today for 6 month follow-up with a history of coronary artery disease and cardiac risk factors. Since last visit, he has been experiencing some mild occasional lateral chest pain on the left which is localized and appears musculoskeletal. This typically occurs at rest, and he has not had to take nitroglycerin. He admits he has not been taking his atorvastatin due to myalgias. Patient still smokes but has cut down to 3 cigarettes per day. He drinks a lot of caffeine, with 3 cups of coffee on average in the morning. Denies shortness of breath, leg swelling, palpitations, and syncope. Remains busy and active with no significant cardiac limitations.    Allergies   Allergen Reactions   • Sulfa Antibiotics Rash         Current Outpatient Medications:   •  albuterol (PROVENTIL HFA;VENTOLIN HFA) 108 (90 BASE) MCG/ACT inhaler, Inhale 2 puffs 4 (four) times a day., Disp: 1 inhaler, Rfl: 11  •  amLODIPine (NORVASC) 5 MG  tablet, Take 1 tablet by mouth Daily., Disp: 90 tablet, Rfl: 3  •  aspirin 81 MG EC tablet, Take 81 mg by mouth Daily., Disp: , Rfl:   •  atorvastatin (LIPITOR) 80 MG tablet, Take 1 tablet by mouth Every Night., Disp: 90 tablet, Rfl: 1  •  carvedilol (COREG) 12.5 MG tablet, Take 1 tablet by mouth Every 12 (Twelve) Hours., Disp: 180 tablet, Rfl: 3  •  clopidogrel (PLAVIX) 75 MG tablet, TAKE 1 TABLET BY MOUTH EVERY DAY, Disp: 90 tablet, Rfl: 0  •  ipratropium-albuterol (DUO-NEB) 0.5-2.5 mg/mL nebulizer, Take 3 mL by nebulization 4 (four) times a day., Disp: 360 mL, Rfl: 0  •  lisinopril (PRINIVIL,ZESTRIL) 5 MG tablet, Take 1 tablet by mouth Daily., Disp: 90 tablet, Rfl: 3  •  mometasone-formoterol (DULERA 100) 100-5 MCG/ACT inhaler, Inhale 2 puffs 2 (Two) Times a Day., Disp: , Rfl:   •  nitroglycerin (NITROSTAT) 0.4 MG SL tablet, Place 0.4 mg under the tongue Every 5 (Five) Minutes As Needed for Chest Pain (has not needed). Take no more than 3 doses in 15 minutes. , Disp: , Rfl:   •  ranolazine (RANEXA) 500 MG 12 hr tablet, Take 1 tablet by mouth Every 12 (Twelve) Hours., Disp: 180 tablet, Rfl: 1  •  rOPINIRole (REQUIP) 0.25 MG tablet, Take 0.25 mg by mouth every night. Take 1 hour before bedtime., Disp: , Rfl:   •  tamsulosin (FLOMAX) 0.4 MG capsule 24 hr capsule, Take 1 capsule by mouth every night., Disp: , Rfl:     The following portions of the patient's history were reviewed and updated as appropriate: allergies, current medications, past family history, past medical history, past social history, past surgical history and problem list.    ROS  Review of Systems   Constitution: Negative for chills, fatigue, fever, generalized weakness, weight gain and weight loss.   Cardiovascular: Negative for claudication, dyspnea on exertion, leg swelling, orthopnea, palpitations, paroxysmal nocturnal dyspnea and syncope. Positive for chest pain  Respiratory: Negative for cough, shortness of breath, and wheezing.  HENT:  "Negative for ear pain, nosebleeds, and tinnitus.  Gastrointestinal: Negative for abdominal pain, constipation, diarrhea, nausea and vomiting.   Genitourinary: No urinary symptoms   Musculoskeletal: Positive for myalgias.  Neurological: Negative for dizziness, headaches, loss of balance, numbness, and symptoms of stroke.  Psychiatric: Normal mental status.     All other systems reviewed and are negative.    Objective:     /71 (BP Location: Right arm, Patient Position: Sitting)   Pulse 75   Ht 180.3 cm (71\")   Wt 73.9 kg (163 lb)   BMI 22.73 kg/m²        Physical Exam  Constitutional: Patient appears well-developed and well-nourished.   HENT: HEENT exam unremarkable.   Neck: Neck supple. No JVD present. No carotid bruits.   Cardiovascular: Normal rate, regular rhythm and normal heart sounds. No murmur heard.   2+ symmetric pulses.   Pulmonary/Chest: Breath sounds normal. Does not exhibit tenderness.   Abdominal: Abdomen benign.   Musculoskeletal: Does not exhibit edema.   Neurological: Neurological exam unremarkable.   Vitals reviewed.    Lab Review:   Lab Results   Component Value Date    GLUCOSE 105 (H) 05/22/2019    BUN 15 05/22/2019    CREATININE 1.08 05/22/2019    EGFRIFNONA 69 05/22/2019    BCR 13.9 05/22/2019    K 4.1 05/22/2019    CO2 28.0 05/22/2019    CALCIUM 9.6 05/22/2019    ALBUMIN 4.30 05/22/2019    AST 14 05/22/2019    ALT 12 05/22/2019     Lab Results   Component Value Date    CHOL 241 (H) 05/22/2019    TRIG 254 (H) 05/22/2019    HDL 33 (L) 05/22/2019     (H) 05/22/2019      Lab Results   Component Value Date    WBC 5.00 05/22/2019    HGB 14.6 05/22/2019    HCT 41.6 05/22/2019    MCV 90.2 05/22/2019     05/22/2019     Lab Results   Component Value Date    HGBA1C 5.50 05/22/2019        Procedures       Assessment:      Diagnosis Plan   1. Coronary artery disease involving native coronary artery of native heart with stable angina pectoris (CMS/HCC)  Stable. Continue current " medications.   2. Essential hypertension  Well-controlled, continue current medications.   3. Hyperlipidemia LDL goal <70  Re start atorvastatin 80 mg for management of hyperlipidemia. OTC Vitamin D and CoQ 10 for myalgias.  If he is unable to tolerate or if his lipid panel in 2 months remains abnormal we will consider PCSK9 inhibitor therapy.   4. Tobacco abuse  Smoking cessation strongly recommended.     Plan:   Re start atorvastatin 80 mg for management of hyperlipidemia. OTC Vitamin D and CoQ 10 for myalgias.  Recheck FLP/CMP in 2 months, if not to goal or if he cannot tolerate statin therapy we will consider PCSK9 inhibitors.  Smoking cessation strongly recommended.  Regarding his benign/symptom medic arrhythmias he was advised to cut back on his caffeine consumption and continue current medications.  Continue current medications.   FU in 6 MO, sooner as needed.  Thank you for allowing us to participate in the care of your patient.     Scribed for Amarilis Jacome MD by Meggan Briones. 7/16/2019  11:51 AM      I, Amarilis Jacome MD, personally performed the services described in this documentation as scribed by the above named individual in my presence, and it is both accurate and complete.  7/16/2019  11:59 AM        Please note that portions of this note may have been completed with a voice recognition program. Efforts were made to edit the dictations, but occasionally words are mistranscribed.

## 2019-08-15 RX ORDER — LISINOPRIL 5 MG/1
TABLET ORAL
Qty: 90 TABLET | Refills: 2 | Status: SHIPPED | OUTPATIENT
Start: 2019-08-15 | End: 2022-02-22

## 2019-08-28 RX ORDER — CARVEDILOL 12.5 MG/1
TABLET ORAL
Qty: 180 TABLET | Refills: 0 | Status: SHIPPED | OUTPATIENT
Start: 2019-08-28 | End: 2022-02-24 | Stop reason: SDUPTHER

## 2019-10-14 RX ORDER — CLOPIDOGREL BISULFATE 75 MG/1
TABLET ORAL
Qty: 90 TABLET | Refills: 1 | Status: SHIPPED | OUTPATIENT
Start: 2019-10-14 | End: 2020-05-22

## 2020-02-18 ENCOUNTER — OFFICE VISIT (OUTPATIENT)
Dept: CARDIOLOGY | Facility: CLINIC | Age: 65
End: 2020-02-18

## 2020-02-18 VITALS
HEIGHT: 71 IN | OXYGEN SATURATION: 98 % | HEART RATE: 86 BPM | WEIGHT: 166 LBS | DIASTOLIC BLOOD PRESSURE: 70 MMHG | BODY MASS INDEX: 23.24 KG/M2 | SYSTOLIC BLOOD PRESSURE: 110 MMHG

## 2020-02-18 DIAGNOSIS — I10 ESSENTIAL HYPERTENSION: ICD-10-CM

## 2020-02-18 DIAGNOSIS — I25.110 CORONARY ARTERY DISEASE INVOLVING NATIVE CORONARY ARTERY OF NATIVE HEART WITH UNSTABLE ANGINA PECTORIS (HCC): Primary | ICD-10-CM

## 2020-02-18 DIAGNOSIS — Z72.0 TOBACCO ABUSE: ICD-10-CM

## 2020-02-18 DIAGNOSIS — E78.5 HYPERLIPIDEMIA LDL GOAL <70: ICD-10-CM

## 2020-02-18 PROBLEM — M19.90 ARTHRITIS: Status: RESOLVED | Noted: 2020-02-18 | Resolved: 2020-02-18

## 2020-02-18 PROCEDURE — 99214 OFFICE O/P EST MOD 30 MIN: CPT | Performed by: PHYSICIAN ASSISTANT

## 2020-02-18 NOTE — PROGRESS NOTES
[facility]    Encounter Date:2020        Patient ID: Roberto Cobos is a 64 y.o. male.  : 1955     PCP: Provider, No Known     Chief Complaint: Coronary artery disease involving native coronary artery of       PROBLEM LIST:  1. Coronary artery disease:  a. [facility], 2016: 99% stenosis of PLV branch of RCA now s/p 2.25 x 18 mm MARI. Non-occlusive ISR of mid RCA.  of LCx at previous stent site. Mid plaque proximal LAD and moderate nonocclusive plaque of the apical segment of LAD. EF 60%.   b. Echocardiogram, 2016: Normal diastolic function. EF 70-75%. No valvular problems noted.  c. [facility], 2019: 75% ISR of mid-RCA now s/p 3.0 x 38 mm MARI post dilated to 3.5 mm reducing the stenosis to no significant residual disease.  of proximal LCx served by left-sided collaterals. Nonobstructive LAD disease and residual nonobstructive proximal an distal RCA disease. Medical management. EF 65%.  2. Cardiac arrhythmias:  a. 24h Holter, 2019: SR with occasional PVCs, very frequent APCs (26.9%) occasional PAT/SVT, no symptoms reported.  3. Hypertension  4. Hyperlipidemia  5. Bladder cancer:  a. Hospitalized at [facility] on 16 for hematuria.  6. Prostate cancer  7. Tobacco abuse:  a. Down to 3 cigarettes per day on 2019 visit.    History of Present Illness  Patient presents today for 6 month follow-up with a history of coronary artery disease, cardiac arrhythmias, and cardiac risk factors. Since last visit, since his last visit he has done well.  He has no current complaint of exertional chest pain.  His dyspnea is at baseline.  He had recent pneumonia and pleurisy and has residual pleuritic chest discomfort with deep inspiration.  He denies orthopnea, PND, claudication, lower extremity edema.  He has no awareness of tachyarrhythmias, no dizziness or syncope.  States his blood pressures at home runs in the 140 systolic.  He has not had a fasting  lipid panel or CMP.    Allergies   Allergen Reactions   • Sulfa Antibiotics Rash         Current Outpatient Medications:   •  albuterol (PROVENTIL HFA;VENTOLIN HFA) 108 (90 BASE) MCG/ACT inhaler, Inhale 2 puffs 4 (four) times a day., Disp: 1 inhaler, Rfl: 11  •  amLODIPine (NORVASC) 5 MG tablet, Take 1 tablet by mouth Daily., Disp: 90 tablet, Rfl: 3  •  aspirin 81 MG EC tablet, Take 81 mg by mouth Daily., Disp: , Rfl:   •  atorvastatin (LIPITOR) 80 MG tablet, Take 1 tablet by mouth Every Night., Disp: 90 tablet, Rfl: 1  •  carvedilol (COREG) 12.5 MG tablet, TAKE 1 TABLET BY MOUTH EVERY 12 HOURS, Disp: 180 tablet, Rfl: 0  •  clopidogrel (PLAVIX) 75 MG tablet, TAKE 1 TABLET BY MOUTH EVERY DAY, Disp: 90 tablet, Rfl: 1  •  ipratropium-albuterol (DUO-NEB) 0.5-2.5 mg/mL nebulizer, Take 3 mL by nebulization 4 (four) times a day., Disp: 360 mL, Rfl: 0  •  lisinopril (PRINIVIL,ZESTRIL) 5 MG tablet, TAKE 1 TABLET BY MOUTH EVERY DAY, Disp: 90 tablet, Rfl: 2  •  mometasone-formoterol (DULERA 100) 100-5 MCG/ACT inhaler, Inhale 2 puffs 2 (Two) Times a Day., Disp: , Rfl:   •  nitroglycerin (NITROSTAT) 0.4 MG SL tablet, Place 0.4 mg under the tongue Every 5 (Five) Minutes As Needed for Chest Pain (has not needed). Take no more than 3 doses in 15 minutes. , Disp: , Rfl:   •  ranolazine (RANEXA) 500 MG 12 hr tablet, Take 1 tablet by mouth Every 12 (Twelve) Hours., Disp: 180 tablet, Rfl: 1  •  rOPINIRole (REQUIP) 0.25 MG tablet, Take 0.25 mg by mouth every night. Take 1 hour before bedtime., Disp: , Rfl:   •  tamsulosin (FLOMAX) 0.4 MG capsule 24 hr capsule, Take 1 capsule by mouth every night., Disp: , Rfl:     The following portions of the patient's history were reviewed and updated as appropriate: allergies, current medications, past family history, past medical history, past social history, past surgical history and problem list.    ROS  Review of Systems   Constitution: Negative for chills, fatigue, fever, generalized weakness.  "  Cardiovascular: Negative for chest pain, claudication, dyspnea on exertion, leg swelling, orthopnea, palpitations, paroxysmal nocturnal dyspnea and syncope.   Respiratory: Negative for cough, shortness of breath, and wheezing.  HENT: Negative for ear pain, nosebleeds, and tinnitus.  Gastrointestinal: Negative for abdominal pain, constipation, diarrhea, nausea and vomiting.   Genitourinary: No urinary symptoms.  Musculoskeletal: Negative for muscle cramps.  Neurological: Negative for dizziness, headaches, loss of balance, numbness, and symptoms of stroke.  Psychiatric: Normal mental status.     All other systems reviewed and are negative.    Objective:     /70 (BP Location: Left arm, Patient Position: Sitting)   Pulse 86   Ht 180.3 cm (71\")   Wt 75.3 kg (166 lb)   SpO2 98%   BMI 23.15 kg/m²          Physical Exam  Constitutional: Patient appears well-developed and well-nourished.   HENT: HEENT exam unremarkable.   Neck: Neck supple. No JVD present. No carotid bruits.   Cardiovascular: Normal rate, regular rhythm and normal heart sounds. No murmur heard.   2+ symmetric pulses.       Pulmonary/Chest: Breath sounds normal. Does not exhibit tenderness.   Abdominal: Abdomen benign.   Musculoskeletal: Does not exhibit edema.   Neurological: Neurological exam unremarkable.   Vitals reviewed.    Lab Review:   Lab Results   Component Value Date    GLUCOSE 105 (H) 05/22/2019    BUN 15 05/22/2019    CREATININE 1.08 05/22/2019    EGFRIFNONA 69 05/22/2019    BCR 13.9 05/22/2019    K 4.1 05/22/2019    CO2 28.0 05/22/2019    CALCIUM 9.6 05/22/2019    ALBUMIN 4.30 05/22/2019    AST 14 05/22/2019    ALT 12 05/22/2019     Lab Results   Component Value Date    CHOL 241 (H) 05/22/2019    TRIG 254 (H) 05/22/2019    HDL 33 (L) 05/22/2019     (H) 05/22/2019      Lab Results   Component Value Date    WBC 5.00 05/22/2019    HGB 14.6 05/22/2019    HCT 41.6 05/22/2019    MCV 90.2 05/22/2019     05/22/2019    c  Lab " Results   Component Value Date    HGBA1C 5.50 05/22/2019        Procedures       Assessment:      Diagnosis Plan   1. Coronary artery disease involving native coronary artery of native heart with unstable angina pectoris (CMS/HCC)   no current anginal symptoms, continue current medical regimen   2. Essential hypertension   well managed current medical regimen   3. Hyperlipidemia LDL goal <70  Lipid panel    Comprehensive metabolic panel   4. Tobacco abuse   counseled for less than 3 minutes     Plan:   I am checking a fasting lipid panel and CMP today.  If his liver enzymes remain normal he would be cleared to take psoriasis medications from a cardiac standpoint.      Stable cardiac status.  Continue current medications.   FU in 6 MO, sooner as needed.  Thank you for allowing us to participate in the care of your patient.           Electronically signed by DORI Roldan, 02/18/20, 11:23 AM.      Please note that portions of this note may have been completed with a voice recognition program. Efforts were made to edit the dictations, but occasionally words are mistranscribed.

## 2020-05-22 RX ORDER — CLOPIDOGREL BISULFATE 75 MG/1
TABLET ORAL
Qty: 90 TABLET | Refills: 0 | Status: SHIPPED | OUTPATIENT
Start: 2020-05-22 | End: 2020-09-29 | Stop reason: SDUPTHER

## 2020-08-21 RX ORDER — RANOLAZINE 500 MG/1
500 TABLET, EXTENDED RELEASE ORAL EVERY 12 HOURS SCHEDULED
Qty: 180 TABLET | Refills: 0 | Status: SHIPPED | OUTPATIENT
Start: 2020-08-21 | End: 2020-08-21 | Stop reason: SDUPTHER

## 2020-08-21 RX ORDER — RANOLAZINE 500 MG/1
500 TABLET, EXTENDED RELEASE ORAL EVERY 12 HOURS SCHEDULED
Qty: 180 TABLET | Refills: 0 | Status: SHIPPED | OUTPATIENT
Start: 2020-08-21 | End: 2021-04-21 | Stop reason: SDUPTHER

## 2020-08-25 ENCOUNTER — OFFICE VISIT (OUTPATIENT)
Dept: CARDIOLOGY | Facility: CLINIC | Age: 65
End: 2020-08-25

## 2020-08-25 VITALS
OXYGEN SATURATION: 95 % | WEIGHT: 155 LBS | DIASTOLIC BLOOD PRESSURE: 70 MMHG | SYSTOLIC BLOOD PRESSURE: 98 MMHG | HEIGHT: 71 IN | HEART RATE: 76 BPM | BODY MASS INDEX: 21.7 KG/M2

## 2020-08-25 DIAGNOSIS — I25.110 CORONARY ARTERY DISEASE INVOLVING NATIVE CORONARY ARTERY OF NATIVE HEART WITH UNSTABLE ANGINA PECTORIS (HCC): Primary | ICD-10-CM

## 2020-08-25 DIAGNOSIS — E78.5 HYPERLIPIDEMIA LDL GOAL <70: ICD-10-CM

## 2020-08-25 DIAGNOSIS — I10 ESSENTIAL HYPERTENSION: ICD-10-CM

## 2020-08-25 PROCEDURE — 99214 OFFICE O/P EST MOD 30 MIN: CPT | Performed by: INTERNAL MEDICINE

## 2020-08-25 NOTE — PROGRESS NOTES
[facility] Cardiology    Encounter Date: 2020    Patient ID: Roberto Cobos is a 64 y.o. male.  : 1955     PCP: Provider, No Known       Chief Complaint: Coronary Artery Disease      PROBLEM LIST:  1. Coronary artery disease:  a. [facility], 2016: 99% stenosis of PLV branch of RCA now s/p 2.25 x 18 mm MARI. Non-occlusive ISR of mid RCA.  of LCx at previous stent site. Mid plaque proximal LAD and moderate nonocclusive plaque of the apical segment of LAD. EF 60%.   b. Echocardiogram, 2016: Normal diastolic function. EF 70-75%. No valvular problems noted.  c. [facility], 2019: 75% ISR of mid-RCA now s/p 3.0 x 38 mm MARI post dilated to 3.5 mm reducing the stenosis to no significant residual disease.  of proximal LCx served by left-sided collaterals. Nonobstructive LAD disease and residual nonobstructive proximal an distal RCA disease. Medical management. EF 65%.  2. Cardiac arrhythmias:  a. 24h Holter, 2019: SR with occasional PVCs, very frequent APCs (26.9%) occasional PAT/SVT, no symptoms reported.  3. Hypertension  4. Hyperlipidemia  5. Bladder cancer:  a. Hospitalized at [facility] on 16 for hematuria.  6. Prostate cancer  7. Tobacco abuse:  a. Down to 3 cigarettes per day on 2019 visit.    History of Present Illness  Patient presents today for a 6 month follow-up with a history of CAD and cardiac risk factors. Since last visit, he has been feeling well overall from a cardiovascular standpoint. 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 He has a primary care provider that has been monitoring his laboratory studies. He doesn't take his atorvastatin often because he experiences leg pain. Patient denies chest pain, shortness of breath, palpitations, edema, dizziness, and syncope.       Allergies   Allergen Reactions   • Sulfa Antibiotics Rash         Current Outpatient Medications:   •  albuterol (PROVENTIL HFA;VENTOLIN HFA)  108 (90 BASE) MCG/ACT inhaler, Inhale 2 puffs 4 (four) times a day., Disp: 1 inhaler, Rfl: 11  •  amLODIPine (NORVASC) 5 MG tablet, Take 1 tablet by mouth Daily., Disp: 90 tablet, Rfl: 3  •  aspirin 81 MG EC tablet, Take 81 mg by mouth Daily., Disp: , Rfl:   •  atorvastatin (LIPITOR) 80 MG tablet, Take 1 tablet by mouth Every Night., Disp: 90 tablet, Rfl: 1  •  carvedilol (COREG) 12.5 MG tablet, TAKE 1 TABLET BY MOUTH EVERY 12 HOURS, Disp: 180 tablet, Rfl: 0  •  clopidogrel (PLAVIX) 75 MG tablet, TAKE 1 TABLET BY MOUTH EVERY DAY, Disp: 90 tablet, Rfl: 0  •  ipratropium-albuterol (DUO-NEB) 0.5-2.5 mg/mL nebulizer, Take 3 mL by nebulization 4 (four) times a day., Disp: 360 mL, Rfl: 0  •  lisinopril (PRINIVIL,ZESTRIL) 5 MG tablet, TAKE 1 TABLET BY MOUTH EVERY DAY, Disp: 90 tablet, Rfl: 2  •  mometasone-formoterol (DULERA 100) 100-5 MCG/ACT inhaler, Inhale 2 puffs 2 (Two) Times a Day., Disp: , Rfl:   •  nitroglycerin (NITROSTAT) 0.4 MG SL tablet, Place 0.4 mg under the tongue Every 5 (Five) Minutes As Needed for Chest Pain (has not needed). Take no more than 3 doses in 15 minutes. , Disp: , Rfl:   •  ranolazine (Ranexa) 500 MG 12 hr tablet, Take 1 tablet by mouth Every 12 (Twelve) Hours., Disp: 180 tablet, Rfl: 0  •  rOPINIRole (REQUIP) 0.25 MG tablet, Take 0.25 mg by mouth every night. Take 1 hour before bedtime., Disp: , Rfl:   •  tamsulosin (FLOMAX) 0.4 MG capsule 24 hr capsule, Take 1 capsule by mouth every night., Disp: , Rfl:     The following portions of the patient's history were reviewed and updated as appropriate: allergies, current medications, past family history, past medical history, past social history, past surgical history and problem list.    ROS  Review of Systems   Constitution: Negative for chills, fever, fatigue, generalized weakness.   Cardiovascular: Negative for chest pain, dyspnea on exertion, leg swelling, palpitations, orthopnea, and syncope.   Respiratory: Negative for cough, shortness of  "breath, and wheezing.  HENT: Negative for ear pain, nosebleeds, and tinnitus.  Gastrointestinal: Negative for abdominal pain, constipation, diarrhea, nausea and vomiting.   Genitourinary: No urinary symptoms.  Musculoskeletal: Negative for muscle cramps.  Neurological: Negative for dizziness, headaches, loss of balance, numbness, and symptoms of stroke.  Psychiatric: Normal mental status.     All other systems reviewed and are negative.        Objective:     BP 98/70 (BP Location: Left arm, Patient Position: Sitting)   Pulse 76   Ht 180.3 cm (71\")   Wt 70.3 kg (155 lb)   SpO2 95%   BMI 21.62 kg/m²      Physical Exam  Constitutional: Patient appears well-developed and well-nourished.   HENT: HEENT exam unremarkable.   Neck: Neck supple. No JVD present. No carotid bruits.   Cardiovascular: Normal rate, regular rhythm and normal heart sounds. No murmur heard.   2+ symmetric pulses.   Pulmonary/Chest: Breath sounds normal. Does not exhibit tenderness.   Abdominal: Abdomen benign.   Musculoskeletal: Does not exhibit edema.   Neurological: Neurological exam unremarkable.   Vitals reviewed.    Data Review:   Lab date: 02/18/2020  • FLP: , , HDL 27,   • CMP: Glu 110, BUN 23, Creat 1.31, eGFR 55, Na 143, K 4.6, Cl 102, CO2 31, Ca 9.4, Alk Phos 79, AST 32, ALT 13       Procedures       Assessment:      Diagnosis Plan   1. Coronary artery disease involving native coronary artery of native heart with unstable angina pectoris (CMS/[facility])  Comprehensive Metabolic Panel and Lipid Panel ordered. Stable; continue current medications.    2. Hyperlipidemia LDL goal <70, not at goal per last labs as he has not been compliant with his Lipitor. FLP ordered; continue atorvastatin 80 mg daily for three months and call back if still experiencing leg pain.    3. Essential hypertension  Stable; continue current medications.      Plan:   Take atorvastatin as prescribed for three months. Call back if still experiencing leg " pain and we will consider Repatha.   Continue current medications.   FU in 6 MO, sooner as needed.  Thank you for allowing us to participate in the care of your patient.       Scribed for Amarilis Jacome MD by Felicity Walters. 8/25/2020  11:03     I, Amarilis Jacome MD, personally performed the services described in this documentation as scribed by the above named individual in my presence, and it is both accurate and complete.  8/25/2020  11:08            Please note that portions of this note may have been completed with a voice recognition program. Efforts were made to edit the dictations, but occasionally words are mistranscribed.

## 2020-09-29 RX ORDER — CLOPIDOGREL BISULFATE 75 MG/1
75 TABLET ORAL DAILY
Qty: 90 TABLET | Refills: 1 | Status: SHIPPED | OUTPATIENT
Start: 2020-09-29 | End: 2022-02-24 | Stop reason: SDUPTHER

## 2020-11-05 ENCOUNTER — TELEPHONE (OUTPATIENT)
Dept: CARDIOLOGY | Facility: CLINIC | Age: 65
End: 2020-11-05

## 2020-12-07 ENCOUNTER — TELEPHONE (OUTPATIENT)
Dept: CARDIOLOGY | Facility: CLINIC | Age: 65
End: 2020-12-07

## 2020-12-07 NOTE — TELEPHONE ENCOUNTER
Pt needing CC for a cystoscopy and bladder biopsy. [facility] 5/2019 w stent placement. LOV 8/25/20. Pt currently on Plavix and ASA. Please advise.     Will fax CC to [phone] upon AA recommendations.

## 2021-04-21 RX ORDER — RANOLAZINE 500 MG/1
500 TABLET, EXTENDED RELEASE ORAL EVERY 12 HOURS SCHEDULED
Qty: 180 TABLET | Refills: 3 | Status: SHIPPED | OUTPATIENT
Start: 2021-04-21 | End: 2022-05-27 | Stop reason: SDUPTHER

## 2021-05-04 ENCOUNTER — OFFICE VISIT (OUTPATIENT)
Dept: CARDIOLOGY | Facility: CLINIC | Age: 66
End: 2021-05-04

## 2021-05-04 VITALS
DIASTOLIC BLOOD PRESSURE: 76 MMHG | HEIGHT: 71 IN | SYSTOLIC BLOOD PRESSURE: 146 MMHG | HEART RATE: 60 BPM | BODY MASS INDEX: 22.68 KG/M2 | WEIGHT: 162 LBS

## 2021-05-04 DIAGNOSIS — I25.10 CORONARY ARTERY DISEASE INVOLVING NATIVE CORONARY ARTERY OF NATIVE HEART WITHOUT ANGINA PECTORIS: Primary | ICD-10-CM

## 2021-05-04 DIAGNOSIS — Z72.0 TOBACCO ABUSE: ICD-10-CM

## 2021-05-04 DIAGNOSIS — I10 ESSENTIAL HYPERTENSION: ICD-10-CM

## 2021-05-04 DIAGNOSIS — E78.5 DYSLIPIDEMIA: ICD-10-CM

## 2021-05-04 PROCEDURE — 99214 OFFICE O/P EST MOD 30 MIN: CPT | Performed by: INTERNAL MEDICINE

## 2021-05-04 RX ORDER — BUDESONIDE AND FORMOTEROL FUMARATE DIHYDRATE 160; 4.5 UG/1; UG/1
AEROSOL RESPIRATORY (INHALATION)
COMMUNITY
Start: 2021-03-01 | End: 2022-09-27

## 2021-05-04 RX ORDER — AMLODIPINE BESYLATE 10 MG/1
10 TABLET ORAL DAILY
Qty: 90 TABLET | Refills: 3 | Status: SHIPPED | OUTPATIENT
Start: 2021-05-04 | End: 2022-02-24 | Stop reason: SDUPTHER

## 2021-05-04 NOTE — PROGRESS NOTES
[facility] Cardiology    Encounter Date: 2021    Patient ID: Roberto Cobos is a 65 y.o. male.  : 1955     PCP: Provider, No Known       Chief Complaint: Coronary Artery Disease      PROBLEM LIST:  1. Coronary artery disease:  a. [facility], 2016: 99% stenosis of PLV branch of RCA now s/p 2.25 x 18 mm MARI. Non-occlusive ISR of mid RCA.  of LCx at previous stent site. Mid plaque proximal LAD and moderate nonocclusive plaque of the apical segment of LAD. EF 60%.   b. Echocardiogram, 2016: Normal diastolic function. EF 70-75%. No valvular problems noted.  c. [facility], 2019: 75% ISR of mid-RCA now s/p 3.0 x 38 mm MARI post dilated to 3.5 mm reducing the stenosis to no significant residual disease.  of proximal LCx served by left-sided collaterals. Nonobstructive LAD disease and residual nonobstructive proximal an distal RCA disease. Medical management. EF 65%.  2. Cardiac arrhythmias:  a. 24h Holter, 2019: SR with occasional PVCs, very frequent APCs (26.9%) occasional PAT/SVT, no symptoms reported.  3. Hypertension  4. Hyperlipidemia  5. Bladder cancer:  a. Hospitalized at [facility] on 16 for hematuria.  6. Prostate cancer  7. Tobacco abuse:  a. Down to 3 cigarettes per day on 2019 visit.    History of Present Illness  Patient presents today for a 6 month follow-up with a history of coronary artery disease and cardiac risk factors. Since last visit, he has been feeling well overall from a cardiovascular standpoint. He works on race cars and stays active. He notes that he is supposed to have an endoscopic procedure and asks if he can hold his Plavix. His inhaler was recently changed. He still smokes approximately eight cigarettes per day. Patient otherwise denies chest pain, shortness of breath, PND, edema, palpitations, syncope, or presyncope at this time.      Allergies   Allergen Reactions   • Sulfa Antibiotics Rash         Current Outpatient  Medications:   •  albuterol (PROVENTIL HFA;VENTOLIN HFA) 108 (90 BASE) MCG/ACT inhaler, Inhale 2 puffs 4 (four) times a day., Disp: 1 inhaler, Rfl: 11  •  amLODIPine (NORVASC) 10 MG tablet, Take 1 tablet by mouth Daily., Disp: 90 tablet, Rfl: 3  •  aspirin 81 MG EC tablet, Take 81 mg by mouth Daily., Disp: , Rfl:   •  atorvastatin (LIPITOR) 80 MG tablet, Take 1 tablet by mouth Every Night., Disp: 90 tablet, Rfl: 1  •  carvedilol (COREG) 12.5 MG tablet, TAKE 1 TABLET BY MOUTH EVERY 12 HOURS, Disp: 180 tablet, Rfl: 0  •  clopidogrel (PLAVIX) 75 MG tablet, Take 1 tablet by mouth Daily., Disp: 90 tablet, Rfl: 1  •  ipratropium-albuterol (DUO-NEB) 0.5-2.5 mg/mL nebulizer, Take 3 mL by nebulization 4 (four) times a day., Disp: 360 mL, Rfl: 0  •  lisinopril (PRINIVIL,ZESTRIL) 5 MG tablet, TAKE 1 TABLET BY MOUTH EVERY DAY, Disp: 90 tablet, Rfl: 2  •  nitroglycerin (NITROSTAT) 0.4 MG SL tablet, Place 0.4 mg under the tongue Every 5 (Five) Minutes As Needed for Chest Pain (has not needed). Take no more than 3 doses in 15 minutes. , Disp: , Rfl:   •  ranolazine (Ranexa) 500 MG 12 hr tablet, Take 1 tablet by mouth Every 12 (Twelve) Hours., Disp: 180 tablet, Rfl: 3  •  rOPINIRole (REQUIP) 0.25 MG tablet, Take 0.25 mg by mouth every night. Take 1 hour before bedtime., Disp: , Rfl:   •  Symbicort 160-4.5 MCG/ACT inhaler, , Disp: , Rfl:   •  tamsulosin (FLOMAX) 0.4 MG capsule 24 hr capsule, Take 1 capsule by mouth every night., Disp: , Rfl:     The following portions of the patient's history were reviewed and updated as appropriate: allergies, current medications, past family history, past medical history, past social history, past surgical history and problem list.    ROS  Review of Systems   Constitution: Negative for chills, fever, fatigue, generalized weakness.   Cardiovascular: Negative for chest pain, dyspnea on exertion, leg swelling, palpitations, orthopnea, and syncope.   Respiratory: Negative for cough, shortness of  "breath, and wheezing.  HENT: Negative for ear pain, nosebleeds, and tinnitus.  Gastrointestinal: Negative for abdominal pain, constipation, diarrhea, nausea and vomiting.   Genitourinary: No urinary symptoms.  Musculoskeletal: Negative for muscle cramps.  Neurological: Negative for dizziness, headaches, loss of balance, numbness, and symptoms of stroke.  Psychiatric: Normal mental status.     All other systems reviewed and are negative.        Objective:     /76 (BP Location: Left arm, Patient Position: Sitting)   Pulse 60   Ht 180.3 cm (71\")   Wt 73.5 kg (162 lb)   BMI 22.59 kg/m²    Repeat BP: 142/90    Physical Exam  Constitutional: Patient appears well-developed and well-nourished.   HENT: HEENT exam unremarkable.   Neck: Neck supple. No JVD present. No carotid bruits.   Cardiovascular: Normal rate, regular rhythm and normal heart sounds. No murmur heard.   2+ symmetric pulses.   Pulmonary/Chest: Breath sounds normal. Does not exhibit tenderness.   Abdominal: Abdomen benign.   Musculoskeletal: Does not exhibit edema.   Neurological: Neurological exam unremarkable.   Vitals reviewed.    Data Review:   Lab date: 02/18/2020  · FLP: , , HDL 27,   · CMP: Glu 110, BUN 23, Creat 1.31, eGFR 55, Na 143, K 4.6, Cl 102, CO2 31, Ca 9.4, Alk Phos 79, AST 32, ALT 13      Procedures       Assessment:      Diagnosis Plan   1. Coronary artery disease involving native coronary artery of native heart without angina pectoris  CMP and FLP ordered. Stable with no current angina; continue aspirin 81 mg daily and Plavix 75 mg daily for DAPT. May hold Plavix for 5 days prior to surgery.   2. Essential hypertension  Poor control; increase amlodipine from 5 mg to 10 mg daily and continue all other medications.     3. Dyslipidemia  FLP and CMP ordered; continue atorvastatin 80 mg nightly.   4. Tobacco abuse  Smoking cessation strongly recommended. Patient counseled for >3 minutes.      Plan:   May hold Plavix " for 5 days prior to GI procvedures.   Increase amlodipine from 5 mg to 10 mg daily.   Continue all other current medications.   FU in 6 MO, sooner as needed.  Thank you for allowing us to participate in the care of your patient.     Scribed for Amarilis Jacome MD by Felicity Walters. 5/4/2021  10:49 EDT      IAmarilis MD, personally performed the services described in this documentation as scribed by the above named individual in my presence, and it is both accurate and complete.  5/4/2021  11:00 EDT        Please note that portions of this note may have been completed with a voice recognition program. Efforts were made to edit the dictations, but occasionally words are mistranscribed.

## 2022-02-07 NOTE — OP NOTE
PREOPERATIVE DIAGNOSIS: Bladder cancer.     POSTOPERATIVE DIAGNOSIS: Bladder cancer.     PROCEDURE: Cystoscopy with bladder biopsies and fulguration.     ANESTHESIA: General.     SURGEON: Bernardino Ray MD    INDICATIONS: This is a 61-year-old white male who had bladder cancer first diagnosed in 2007. He had a recurrence in 2010, 2013 and then in 2016. His lesions have all been low grade and low stage tumors. They have been treated in the past by Bennett Allen, Ileana, Rogers, and Zoey. He now is due for his repeat surveillance cystoscopic exam. He has been asymptomatic.     DESCRIPTION OF PROCEDURE: The patient was placed on the operating table in the dorsal lithotomy position.  General endotracheal anesthesia was administered. The groin was prepped and draped in the usual sterile fashion. The 21 [employer] panendoscope was inserted under video cystoscopy.  The urethra was inspected and noted to be normal. The prostate showed a high riding bladder neck and very minimal lateral lobe hyperplasia and the bladder was entered. Orifices were in their normal location, effluxing clear urine. The bladder does have 1+ trabeculation throughout. At the right side wall just superolateral to the right orifice is an area of  small, raised, erythema. This area was biopsied with the cold cup and cauterized with the Bugbee electrode. The rest of the bladder was free of any lesions, tumors, or other abnormalities. The biopsy results were sent for pathologic examination. The bladder was then drained. The scope was removed atraumatically. He was awakened and taken to the recovery room in stable condition. There were no complications.     MD ALEENA Ruiz/ronni  DD: 04/12/2017 09:56:08  DT: 04/12/2017 10:32:46  Voice Rec. ID #[id number]  Voice Original ID #[id number]  Doc ID #[id number]  Rev. #0  cc:        
(1) More than 48 hours/None

## 2022-02-22 ENCOUNTER — OFFICE VISIT (OUTPATIENT)
Dept: CARDIOLOGY | Facility: CLINIC | Age: 67
End: 2022-02-22

## 2022-02-22 VITALS
HEIGHT: 71 IN | HEART RATE: 92 BPM | SYSTOLIC BLOOD PRESSURE: 116 MMHG | WEIGHT: 155 LBS | BODY MASS INDEX: 21.7 KG/M2 | OXYGEN SATURATION: 95 % | DIASTOLIC BLOOD PRESSURE: 72 MMHG

## 2022-02-22 DIAGNOSIS — U07.1 COVID-19: ICD-10-CM

## 2022-02-22 DIAGNOSIS — R06.09 DYSPNEA ON EXERTION: Primary | ICD-10-CM

## 2022-02-22 DIAGNOSIS — I25.110 CORONARY ARTERY DISEASE INVOLVING NATIVE CORONARY ARTERY OF NATIVE HEART WITH UNSTABLE ANGINA PECTORIS: ICD-10-CM

## 2022-02-22 DIAGNOSIS — R06.09 DOE (DYSPNEA ON EXERTION): ICD-10-CM

## 2022-02-22 DIAGNOSIS — E78.5 HYPERLIPIDEMIA LDL GOAL <70: ICD-10-CM

## 2022-02-22 DIAGNOSIS — I49.3 PVC (PREMATURE VENTRICULAR CONTRACTION): ICD-10-CM

## 2022-02-22 DIAGNOSIS — I25.10 CORONARY ARTERY DISEASE INVOLVING NATIVE CORONARY ARTERY OF NATIVE HEART WITHOUT ANGINA PECTORIS: ICD-10-CM

## 2022-02-22 DIAGNOSIS — I10 ESSENTIAL HYPERTENSION: ICD-10-CM

## 2022-02-22 DIAGNOSIS — Z72.0 TOBACCO ABUSE: ICD-10-CM

## 2022-02-22 PROCEDURE — 99214 OFFICE O/P EST MOD 30 MIN: CPT | Performed by: INTERNAL MEDICINE

## 2022-02-22 PROCEDURE — 93000 ELECTROCARDIOGRAM COMPLETE: CPT | Performed by: INTERNAL MEDICINE

## 2022-02-22 RX ORDER — PREDNISONE 1 MG/1
5 TABLET ORAL TAKE AS DIRECTED
COMMUNITY
Start: 2022-01-28 | End: 2022-11-14

## 2022-02-22 RX ORDER — FINASTERIDE 5 MG/1
5 TABLET, FILM COATED ORAL DAILY
COMMUNITY
Start: 2022-02-07

## 2022-02-22 NOTE — PROGRESS NOTES
[facility] Cardiology    Encounter Date: 2022    Patient ID: Roberto Cobos is a 66 y.o. male.  : 1955     PCP: Provider, No Known       Chief Complaint: Coronary Artery Disease      PROBLEM LIST:  1. Coronary artery disease:  a. [facility], 2016: 99% stenosis of PLV branch of RCA now s/p 2.25 x 18 mm MARI. Non-occlusive ISR of mid RCA.  of LCx at previous stent site. Mid plaque proximal LAD and moderate nonocclusive plaque of the apical segment of LAD. EF 60%.   b. Echocardiogram, 2016: Normal diastolic function. EF 70-75%. No valvular problems noted.  c. [facility], 2019: 75% ISR of mid-RCA now s/p 3.0 x 38 mm MARI post dilated to 3.5 mm reducing the stenosis to no significant residual disease.  of proximal LCx served by left-sided collaterals. Nonobstructive LAD disease and residual nonobstructive proximal an distal RCA disease. Medical management. EF 65%.  2. Cardiac arrhythmias:  a. 24h Holter, 2019: SR with occasional PVCs, very frequent APCs (26.9%) occasional PAT/SVT, no symptoms reported.  3. Hypertension  4. Hyperlipidemia  5. Bladder cancer:  a. Hospitalized at [facility] on 16 for hematuria.  6. Prostate cancer  7. Tobacco abuse:  a. 1/2 ppd    History of Present Illness  Patient presents today for a 6 month follow-up with a history of coronary artery disease and cardiac risk factors. Since last visit, he was diagnosed with COVID in January.  He has not been vaccinated and doesn't intend to get vaccinated.  He denies recent symptoms of chest pain.  He does note that he is fatigued and has some mild shortness of breath. He continues to smoke 1/2 ppd.  He is active ,but performs no regular exercise. His blood pressure has been on the low side at home.    Allergies   Allergen Reactions   • Sulfa Antibiotics Rash         Current Outpatient Medications:   •  albuterol (PROVENTIL HFA;VENTOLIN HFA) 108 (90 BASE) MCG/ACT inhaler, Inhale 2 puffs 4  (four) times a day., Disp: 1 inhaler, Rfl: 11  •  amLODIPine (NORVASC) 10 MG tablet, Take 1 tablet by mouth Daily., Disp: 90 tablet, Rfl: 3  •  aspirin 81 MG EC tablet, Take 81 mg by mouth Daily., Disp: , Rfl:   •  carvedilol (COREG) 12.5 MG tablet, TAKE 1 TABLET BY MOUTH EVERY 12 HOURS (Patient taking differently: 6.25 mg.), Disp: 180 tablet, Rfl: 0  •  clopidogrel (PLAVIX) 75 MG tablet, Take 1 tablet by mouth Daily., Disp: 90 tablet, Rfl: 1  •  finasteride (PROSCAR) 5 MG tablet, Take 5 mg by mouth Daily., Disp: , Rfl:   •  ipratropium-albuterol (DUO-NEB) 0.5-2.5 mg/mL nebulizer, Take 3 mL by nebulization 4 (four) times a day., Disp: 360 mL, Rfl: 0  •  nitroglycerin (NITROSTAT) 0.4 MG SL tablet, Place 0.4 mg under the tongue Every 5 (Five) Minutes As Needed for Chest Pain (has not needed). Take no more than 3 doses in 15 minutes. , Disp: , Rfl:   •  predniSONE (DELTASONE) 5 MG tablet, Take 5 mg by mouth Take As Directed., Disp: , Rfl:   •  ranolazine (Ranexa) 500 MG 12 hr tablet, Take 1 tablet by mouth Every 12 (Twelve) Hours., Disp: 180 tablet, Rfl: 3  •  Symbicort 160-4.5 MCG/ACT inhaler, , Disp: , Rfl:   •  tamsulosin (FLOMAX) 0.4 MG capsule 24 hr capsule, Take 1 capsule by mouth every night., Disp: , Rfl:   •  rOPINIRole (REQUIP) 0.25 MG tablet, Take 0.25 mg by mouth every night. Take 1 hour before bedtime., Disp: , Rfl:     The following portions of the patient's history were reviewed and updated as appropriate: allergies, current medications, past family history, past medical history, past social history, past surgical history and problem list.    ROS  Review of Systems   Constitution: Negative for chills, fever,  generalized weakness. + fatigue  Cardiovascular: Negative for chest pain, leg swelling, palpitations, orthopnea, and syncope. + Dyspnea  Respiratory: Negative for cough, shortness of breath, and wheezing.  HENT: Negative for ear pain, nosebleeds, and tinnitus.  Gastrointestinal: Negative for  "abdominal pain, constipation, diarrhea, nausea and vomiting.   Genitourinary: No urinary symptoms.  Musculoskeletal: Negative for muscle cramps.  Neurological: Negative for dizziness, headaches, loss of balance, numbness, and symptoms of stroke.  Psychiatric: Normal mental status.     All other systems reviewed and are negative.        Objective:     /72 (BP Location: Right arm, Patient Position: Sitting)   Pulse 92   Ht 180.3 cm (71\")   Wt 70.3 kg (155 lb)   SpO2 95%   BMI 21.62 kg/m²      Physical Exam  Constitutional: Patient appears well-developed and well-nourished.   HENT: HEENT exam unremarkable.   Neck: Neck supple. No JVD present. No carotid bruits.   Cardiovascular: Normal rate, regular rhythm and normal heart sounds. No murmur heard.   2+ symmetric pulses.   Pulmonary/Chest: Breath sounds normal. Does not exhibit tenderness.   Abdominal: Abdomen benign.   Musculoskeletal: Does not exhibit edema.   Neurological: Neurological exam unremarkable.   Vitals reviewed.    Data Review:     Lab date: 02/18/2020  · FLP: TC 230, TG 255, HDL 27, LDL 152        ECG 12 Lead    Date/Time: 2/22/2022 11:55 AM  Performed by: Amarilis Jacome MD  Authorized by: Amarilis Jacome MD   Comparison: compared with previous ECG from 5/22/2019  Comparison to previous ECG: EKG reveals frequent   Rhythm: sinus rhythm  Ectopy: unifocal PVCs  Rate: normal  BPM: 92  QRS axis: left    Clinical impression: abnormal EKG               Assessment:      Diagnosis Plan   1. Dyspnea on exertion  Adult Transthoracic Echo Complete W/ Cont if Necessary Per Protocol   2. COVID-19  Adult Transthoracic Echo Complete W/ Cont if Necessary Per Protocol   3. Coronary artery disease involving native coronary artery of native heart with unstable angina pectoris (HCC)  Continue ASA   4. Essential hypertension  Discontinue Lisinopril, continue Amlodipine and Coreg   5. Hyperlipidemia LDL goal <70  currently off Lipitor for PCP, will need reevaluation of " lipid status and reinitiation of statin therapy.     6. LARSON (dyspnea on exertion)  Echo to re-evaluate LVEF post COVID   7. PVC (premature ventricular contraction)     8. Tobacco abuse       Plan:   Discontinue Lisinopril due to low normal blood pressure and symptoms of fatigue.  Echocardiogram to re-evaluate LVEF post COVID with symptoms of fatigue and shortness of breath of concern for cardiomyopathy.  Continue current medications.   FU in 3 MO, sooner as needed.  Thank you for allowing us to participate in the care of your patient.     Scribed for Amarilis Jacome MD by Monica Mejía RN. 2/22/2022  12:16 EST      I, Amarilis Jacome MD, personally performed the services described in this documentation as scribed by the above named individual in my presence, and it is both accurate and complete.  2/22/2022  12:16 EST        Part of this note may be an electronic transcription/translation of spoken language to printed text using the Dragon Dictation System.

## 2022-02-24 RX ORDER — CLOPIDOGREL BISULFATE 75 MG/1
75 TABLET ORAL DAILY
Qty: 90 TABLET | Refills: 1 | Status: SHIPPED | OUTPATIENT
Start: 2022-02-24

## 2022-02-24 RX ORDER — AMLODIPINE BESYLATE 10 MG/1
10 TABLET ORAL DAILY
Qty: 90 TABLET | Refills: 3 | Status: SHIPPED | OUTPATIENT
Start: 2022-02-24

## 2022-02-24 RX ORDER — NITROGLYCERIN 0.4 MG/1
0.4 TABLET SUBLINGUAL
Qty: 100 TABLET | Refills: 0 | Status: SHIPPED | OUTPATIENT
Start: 2022-02-24

## 2022-02-24 RX ORDER — CARVEDILOL 12.5 MG/1
6.25 TABLET ORAL 2 TIMES DAILY WITH MEALS
Qty: 180 TABLET | Refills: 0 | Status: SHIPPED | OUTPATIENT
Start: 2022-02-24 | End: 2022-11-28

## 2022-03-30 ENCOUNTER — OUTSIDE FACILITY SERVICE (OUTPATIENT)
Dept: CARDIOLOGY | Facility: CLINIC | Age: 67
End: 2022-03-30

## 2022-03-30 PROCEDURE — 93306 TTE W/DOPPLER COMPLETE: CPT | Performed by: INTERNAL MEDICINE

## 2022-05-27 RX ORDER — RANOLAZINE 500 MG/1
500 TABLET, EXTENDED RELEASE ORAL EVERY 12 HOURS SCHEDULED
Qty: 180 TABLET | Refills: 3 | Status: SHIPPED | OUTPATIENT
Start: 2022-05-27

## 2022-09-27 ENCOUNTER — OFFICE VISIT (OUTPATIENT)
Dept: PULMONOLOGY | Facility: CLINIC | Age: 67
End: 2022-09-27

## 2022-09-27 VITALS
WEIGHT: 152 LBS | OXYGEN SATURATION: 92 % | BODY MASS INDEX: 21.28 KG/M2 | DIASTOLIC BLOOD PRESSURE: 79 MMHG | HEART RATE: 85 BPM | SYSTOLIC BLOOD PRESSURE: 120 MMHG | RESPIRATION RATE: 16 BRPM | HEIGHT: 71 IN

## 2022-09-27 DIAGNOSIS — J44.9 CHRONIC OBSTRUCTIVE PULMONARY DISEASE, UNSPECIFIED COPD TYPE: ICD-10-CM

## 2022-09-27 DIAGNOSIS — R06.02 SHORTNESS OF BREATH: Primary | ICD-10-CM

## 2022-09-27 DIAGNOSIS — F17.210 NICOTINE DEPENDENCE, CIGARETTES, UNCOMPLICATED: ICD-10-CM

## 2022-09-27 PROCEDURE — 94060 EVALUATION OF WHEEZING: CPT | Performed by: INTERNAL MEDICINE

## 2022-09-27 PROCEDURE — 94726 PLETHYSMOGRAPHY LUNG VOLUMES: CPT | Performed by: INTERNAL MEDICINE

## 2022-09-27 PROCEDURE — 94729 DIFFUSING CAPACITY: CPT | Performed by: INTERNAL MEDICINE

## 2022-09-27 PROCEDURE — 94618 PULMONARY STRESS TESTING: CPT | Performed by: INTERNAL MEDICINE

## 2022-09-27 PROCEDURE — 95012 NITRIC OXIDE EXP GAS DETER: CPT | Performed by: INTERNAL MEDICINE

## 2022-09-27 PROCEDURE — 99204 OFFICE O/P NEW MOD 45 MIN: CPT | Performed by: INTERNAL MEDICINE

## 2022-09-27 RX ORDER — ALBUTEROL SULFATE 90 UG/1
2 AEROSOL, METERED RESPIRATORY (INHALATION) EVERY 4 HOURS PRN
Qty: 18 G | Refills: 5 | Status: SHIPPED | OUTPATIENT
Start: 2022-09-27

## 2022-09-27 RX ORDER — AMLODIPINE BESYLATE 10 MG/1
1 TABLET ORAL DAILY
COMMUNITY

## 2022-09-27 RX ORDER — PREDNISONE 20 MG/1
2 TABLET ORAL
COMMUNITY
End: 2022-11-14

## 2022-09-27 RX ORDER — METHYLPREDNISOLONE 4 MG/1
TABLET ORAL
COMMUNITY
Start: 2022-09-21

## 2022-09-27 RX ORDER — CLOPIDOGREL BISULFATE 75 MG/1
1 TABLET ORAL DAILY
COMMUNITY

## 2022-09-27 RX ORDER — TAMSULOSIN HYDROCHLORIDE 0.4 MG/1
1 CAPSULE ORAL
COMMUNITY

## 2022-09-27 RX ORDER — BUDESONIDE 0.5 MG/2ML
INHALANT ORAL
COMMUNITY
Start: 2022-09-21 | End: 2022-09-27

## 2022-09-27 RX ORDER — FINASTERIDE 5 MG/1
1 TABLET, FILM COATED ORAL DAILY
COMMUNITY

## 2022-09-27 RX ORDER — ASPIRIN 81 MG/1
1 TABLET, CHEWABLE ORAL
COMMUNITY

## 2022-09-27 RX ORDER — LISINOPRIL 5 MG/1
TABLET ORAL
COMMUNITY
Start: 2022-08-27 | End: 2023-02-22

## 2022-09-27 RX ORDER — RANOLAZINE 500 MG/1
1 TABLET, EXTENDED RELEASE ORAL EVERY 12 HOURS
COMMUNITY

## 2022-09-27 RX ORDER — CARVEDILOL 6.25 MG/1
TABLET ORAL
COMMUNITY
Start: 2022-08-27 | End: 2023-02-22

## 2022-09-27 NOTE — PROGRESS NOTES
CONSULT NOTE    Requested by:   Nohemi Maradiaga APRN      Chief Complaint   Patient presents with   • Breathing Problem   • Consult       Subjective:  Roberto Cobos is a 66 y.o. male.     History of Present Illness   Patient was sent in today for evaluation of shortness of breath. Patient says that for the past few years, he has had shortness of breath. Patient has had decreased exercise capacity that has been progressive for the past few years. Patient also says that he brings up phlegm in the morning along with cough.     Patient also notes having progressive worsening in his ability to walk up the hill or walk up a flight of stairs.     The patient has 2 dogs and 1 cat at home. There seems to be a seasonal variation to the symptoms.    The patient is currently not on oxygen.     Patient reports smoking 1 packs per day for the past 50 years.  he is currently smoking 1/2 pack a day.    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     Patient does not have a family history of lung diseases.        The following portions of the patient's history were reviewed and updated as appropriate: allergies, current medications, past family history, past medical history, past social history and past surgical history.    Review of Systems   HENT: Negative for sinus pressure.    Respiratory: Positive for shortness of breath.    Cardiovascular: Negative for palpitations.   Psychiatric/Behavioral: Negative for sleep disturbance.   All other systems reviewed and are negative.      Past Medical History:   Diagnosis Date   • Arthritis    • Back pain    • Bladder cancer (HCC)    • COPD (chronic obstructive pulmonary disease) (HCC)    • COVID-19 01/25/2022   • Dizziness    • Head ache    • Wears eyeglasses    • Wears partial dentures        Social History     Tobacco Use   • Smoking status: Current Every Day Smoker     Packs/day: 0.50     Years: 40.00     Pack years: 20.00     Types:  Reason for Call:  Other orders    Detailed comments: Patient is asking for lab orders prior to pending appt.    Phone Number Patient can be reached at: Home number on file [phone] (home)    Best Time: any    Can we leave a detailed message on this number? YES    Call taken on 9/25/2017 at 1:30 PM by Martha Cash     "Cigarettes   • Smokeless tobacco: Never Used   Substance Use Topics   • Alcohol use: No         Objective:  Visit Vitals  /79   Pulse 85   Resp 16   Ht 180.3 cm (71\")   Wt 68.9 kg (152 lb)   SpO2 92%   BMI 21.20 kg/m²   ============================  ============================    6 MINUTE WALK TEST    Roberto Cobos   1955             BASELINE   SpO2%: 92 % RA    Heart Rate 85   Blood Pressure 120/79     EXERCISE SpO2% HEART RATE RA or O2 @ LPM   1 MINUTE 97 92 RA   2 MINUTES 93 75 RA   3 MINUTES 95 83 RA   4 MINUTES 95 80 RA   5 MINUTES 93 79 RA   6 MINUTES 91 90 RA   (Number of laps: 11 X 36 meters + Final partial lap:  meters = 396 meters)            Distance Walked:  396 Meters   SpO2% Post Exercise: 91 %   HR Post Exercise:  90     Reason to stop (if applicable):   ____ Chest Pain   ____ Light Headedness   ____ Dyspnea Unrelieved by Rest   ____ Abnormal Gait Pattern   ____ Severe Fatigue   ____ Other (Specify: __________________________)    Tech Comments (if any):      Test performed by: JW    ============================  ============================      Physical Exam  Vitals reviewed.   Constitutional:       Appearance: He is well-developed.   HENT:      Head:      Comments: No acute lesions noted     Nose:      Comments: Mild nasal erythema noted.     Mouth/Throat:      Mouth: Mucous membranes are moist.   Neck:      Vascular: No JVD.   Cardiovascular:      Rate and Rhythm: Normal rate and regular rhythm.   Pulmonary:      Effort: Pulmonary effort is normal.      Breath sounds: Wheezing present. No rales.      Comments: Somewhat hyperresonant to percussion.  Somewhat decreased air entry.  Mild scattered wheezing noted.   Musculoskeletal:      Cervical back: Neck supple.      Right lower leg: No edema.      Left lower leg: No edema.      Comments: Gait was normal.   Skin:     General: Skin is warm and dry.   Neurological:      Mental Status: He is alert and oriented to person, place, and time. "   Psychiatric:         Mood and Affect: Mood normal.         Behavior: Behavior normal.         Assessment/Plan:  Diagnoses and all orders for this visit:    1. Shortness of breath (Primary)  -     Converted Six Minute Walk  -     Overnight Sleep Oximetry Study; Future  -     Alpha - 1 - Antitrypsin Phenotype; Future    2. Chronic obstructive pulmonary disease, unspecified COPD type (HCC)  -     Pulmonary Function Test  -     Nitric Oxide  -     Converted Six Minute Walk  -     Overnight Sleep Oximetry Study; Future  -     Alpha - 1 - Antitrypsin Phenotype; Future    3. Nicotine dependence, cigarettes, uncomplicated    Other orders  -     Fluticasone-Umeclidin-Vilant (Trelegy Ellipta) 100-62.5-25 MCG/INH inhaler; Inhale 1 puff Daily. Rinse mouth with water after use.  Dispense: 1 each; Refill: 5  -     albuterol sulfate HFA (Ventolin HFA) 108 (90 Base) MCG/ACT inhaler; Inhale 2 puffs Every 4 (Four) Hours As Needed for Wheezing or Shortness of Air.  Dispense: 18 g; Refill: 5        Return in about 15 weeks (around 1/10/2023) for Recheck, Labs, Overnight P Ox, For [place] ([place]), ....Also 8-9 mths w/ Dr. Singh.    DISCUSSION(if any):  Last chest x-ray was reviewed personally and the results were shared with the patient.  Images reviewed personally.   Results for orders placed during the hospital encounter of 08/15/16    STAT XR chest pa and lateral    Narrative  EXAMINATION: XR CHEST, PA AND LATERAL-08/17/2016:    INDICATION: Productive cough; I20.9-Angina pectoris, unspecified;  I25.110-Atherosclerotic heart disease of native coronary artery with  unstable angina pectoris.    COMPARISON: NONE    FINDINGS: The cardiac silhouette is normal. Spinal cord stimulators are  noted in the midthoracic level. There are chronic pulmonary findings  with no acute inflammatory process, mass or effusion.    Impression  Chronic pulmonary changes; no active disease.    D:  08/17/2016  E:  08/17/2016    This report was finalized on  8/17/2016 11:25 AM by Dr. Delta Fletcher MD.      Last CT scan was reviewed in great detail with the patient. Images reviewed personally.   CT of the chest from March 2022 did not suggest any acute pulmonary disease although there was definite evidence of emphysema.    I have also reviewed his primary care referring provider's last note that mentions COPD and pleurisy.  It also mentioned continued smoking    I also reviewed his last echocardiogram and shared the results with him.           ===========================  ===========================    PFTs were reviewed.  Consistent with severe obstruction and significant hyperinflation    FeNO level was 20 today.    Laboratory workup also showed   Lab Results   Component Value Date    HGB 14.6 05/22/2019    HGB 12.0 (L) 11/09/2017    HGB 13.9 11/08/2017   ,   Lab Results   Component Value Date    HCT 41.6 05/22/2019    HCT 35.6 (L) 11/09/2017    HCT 40.8 11/08/2017       Lab Results   Component Value Date    EOSABS 0.23 05/22/2019    EOSABS 0.24 11/08/2017    EOSABS 0.12 08/15/2016    &Laboratory workup also showed   Lab Results   Component Value Date    CO2 28.0 05/22/2019     ===========================  ===========================    Orders as above.    I have told the patient, that in my view, shortness of breath is most likely from underlying chronic obstructive lung disease.     Patient was given education and demonstration on how to use the medicine.     Side effects, of prescribed medicines, discussed.    Patient was also instructed on compliance and adherence with instructions.     I have discussed the need to quit smoking as soon as possible.    Patient was offered modalities such as Chantix/nicotine patches/Wellbutrin to aid in smoking cessation.    The patient will get back to us regarding the choice, once a decision has been taken.     Patient was given reading material, as appropriate.     We will try to get his recent CT from PCP.    The patient belongs  to the risk group for which lung cancer screening has been recommended. This will be indicated annually. I have made the patient aware of my strong recommendation to discuss this further with primary care provider.    Patient was asked to call with any concerns.     Patient will be followed clinically to assess for response to treatment and further recommendations will be made, based on response.    The following have been ordered, either before or at the time of the next visit.    Laboratory work-up  Overnight pulse ox      Dictated utilizing Dragon dictation.    This document was electronically signed by Mirza Singh MD on 09/27/22 at 10:51 EDT

## 2022-10-24 DIAGNOSIS — J44.9 CHRONIC OBSTRUCTIVE PULMONARY DISEASE, UNSPECIFIED COPD TYPE: Primary | ICD-10-CM

## 2022-11-14 DIAGNOSIS — J44.9 CHRONIC OBSTRUCTIVE PULMONARY DISEASE, UNSPECIFIED COPD TYPE: Primary | ICD-10-CM

## 2022-11-14 RX ORDER — PREDNISONE 20 MG/1
40 TABLET ORAL DAILY
Qty: 10 TABLET | Refills: 0 | Status: SHIPPED | OUTPATIENT
Start: 2022-11-14

## 2022-11-14 NOTE — TELEPHONE ENCOUNTER
Spoke to  he gave me the okay to prescribe this patient prednisone 40 for 5 days.   If patient calls back and is still not feeling any better. Dr. porras said to advise the patient to the ER or a follow up with a NP.

## 2022-11-15 ENCOUNTER — TELEPHONE (OUTPATIENT)
Dept: PULMONOLOGY | Facility: CLINIC | Age: 67
End: 2022-11-15

## 2022-11-28 RX ORDER — CARVEDILOL 12.5 MG/1
6.25 TABLET ORAL 2 TIMES DAILY
Qty: 90 TABLET | Refills: 0 | Status: SHIPPED | OUTPATIENT
Start: 2022-11-28

## 2023-02-22 ENCOUNTER — TELEPHONE (OUTPATIENT)
Dept: CARDIOLOGY | Facility: CLINIC | Age: 68
End: 2023-02-22
Payer: MEDICARE

## 2023-02-22 NOTE — TELEPHONE ENCOUNTER
Patient was seen by Dr. Sterling at [facility]. Patient has Central Serous Chorioretinopathy, which can be helped by adding Spironolactone.   Dr. Sterling was wanting to know if we could prescribe that medication.   Please advice. Thank you!    Patient last seen a year ago and has not followed up.  Patient has eye procedure to help alleviate some of the fluid behind his eyes on 02/27.   Patient can be added to the schedule sometime next month.

## 2023-02-23 NOTE — TELEPHONE ENCOUNTER
Dr. Jacome would like to see patient in office. Recommended patient going to PCP for labs and for medication if his labs are okay.   Will reach out to  to get patient in.

## 2023-02-28 NOTE — TELEPHONE ENCOUNTER
----- Message from Rosemary Maurer sent at 8/13/2018  2:38 PM CDT -----  Contact: self  Pt called requesting a return call back regarding a soon appt with Dr. Benavides for a f/u pt could be reached at [phone]   Patient called back asking which medication Dr. Jacome wanted to changed. I relayed to patient that Dr. Jacome needs to see him in the office to see what he wants to do. Recommended patient to go to PCP to see if they can start Spironolactone or draw any labs but Dr. Jacome needs to see him in the office prior to making any changes. Patient verbalized understanding and will call back if he has any questions.

## 2023-04-11 RX ORDER — CLOPIDOGREL BISULFATE 75 MG/1
75 TABLET ORAL DAILY
Qty: 90 TABLET | Refills: 0 | Status: SHIPPED | OUTPATIENT
Start: 2023-04-11

## 2023-06-05 ENCOUNTER — OFFICE VISIT (OUTPATIENT)
Dept: PULMONOLOGY | Facility: CLINIC | Age: 68
End: 2023-06-05
Payer: MEDICARE

## 2023-06-05 VITALS
HEART RATE: 81 BPM | DIASTOLIC BLOOD PRESSURE: 72 MMHG | BODY MASS INDEX: 23.52 KG/M2 | HEIGHT: 71 IN | RESPIRATION RATE: 18 BRPM | SYSTOLIC BLOOD PRESSURE: 124 MMHG | WEIGHT: 168 LBS | OXYGEN SATURATION: 94 %

## 2023-06-05 DIAGNOSIS — J44.9 CHRONIC OBSTRUCTIVE PULMONARY DISEASE, UNSPECIFIED COPD TYPE: Primary | ICD-10-CM

## 2023-06-05 DIAGNOSIS — F17.210 NICOTINE DEPENDENCE, CIGARETTES, UNCOMPLICATED: ICD-10-CM

## 2023-06-05 PROCEDURE — 99213 OFFICE O/P EST LOW 20 MIN: CPT | Performed by: INTERNAL MEDICINE

## 2023-06-05 PROCEDURE — 3074F SYST BP LT 130 MM HG: CPT | Performed by: INTERNAL MEDICINE

## 2023-06-05 PROCEDURE — 3078F DIAST BP <80 MM HG: CPT | Performed by: INTERNAL MEDICINE

## 2023-06-05 NOTE — PROGRESS NOTES
"  Chief Complaint   Patient presents with    Shortness of Breath    Follow-up       Subjective   Roberto Cobos is a 67 y.o. male.     Shortness of Breath  Pertinent negatives include no leg swelling, sore throat or wheezing.    Patient was evaluated today for follow up of shortness of breath, and COPD.     Patient says that his symptoms have been stable since the last clinic visit. he reports no recent exacerbations.     Patient is using Trelegy, as prescribed. Exercise tolerance has also remained stable.     Continues to smoke 1/2 pack per day.        The following portions of the patient's history were reviewed and updated as appropriate: allergies, current medications, past family history, past medical history, past social history, and past surgical history.    Review of Systems   HENT:  Negative for sinus pressure, sneezing and sore throat.    Respiratory:  Positive for shortness of breath. Negative for cough, chest tightness and wheezing.    Cardiovascular:  Negative for palpitations and leg swelling.     Objective   Visit Vitals  /72   Pulse 81   Resp 18   Ht 180.3 cm (70.98\")   Wt 76.2 kg (168 lb)   SpO2 94%   BMI 23.44 kg/m²       Physical Exam  Vitals reviewed.   Constitutional:       Appearance: He is well-developed.   HENT:      Head: Normocephalic and atraumatic.   Eyes:      Extraocular Movements: Extraocular movements intact.   Cardiovascular:      Rate and Rhythm: Normal rate.   Pulmonary:      Comments: Somewhat hyperresonant to percussion.  Somewhat decreased air entry.  No obvious wheezing noted.   Musculoskeletal:      Cervical back: Neck supple.   Neurological:      Mental Status: He is alert.         Assessment & Plan   Diagnoses and all orders for this visit:    1. Chronic obstructive pulmonary disease, unspecified COPD type (Primary)  -     Fluticasone-Umeclidin-Vilant (Trelegy Ellipta) 100-62.5-25 MCG/ACT inhaler; Inhale 1 puff Daily. Rinse mouth with water after use.  Dispense: 1 " each; Refill: 11    2. Nicotine dependence, cigarettes, uncomplicated  -      CT Chest Low Dose Cancer Screening WO; Future           Return in about 10 months (around 3/29/2024) for Recheck, Imaging, For Khalida Castro).    DISCUSSION (if any):  Last CT scan results was reviewed in great detail with the patient.  Stimulators terminate in the mid thoracic spine. The lungs are   clear. The heart size is normal. There is no pericardial or pleural   effusion. There is no adenopathy.       Latest available PFTs were reviewed.  Consistent with severe COPD.     We have reviewed his pulmonary medications in great detail.    Compliance with medications stressed.     Side effects of prescribed medications discussed with the patient    Patient was strongly encouraged to quit smoking as soon as possible.    The patient belongs to the risk group for which lung cancer screening has been recommended. We will try to make arrangements for the same and this will be indicated in March 2024. This has been ordered.    The patient was reminded to stay up-to-date with pneumonia vaccinations. he will discuss this with his PCP.       Dictated utilizing Dragon dictation.    This document was electronically signed by Mirza Singh MD on 06/05/23 at 12:30 EDT

## 2023-09-10 NOTE — BRIEF OP NOTE
CYSTOSCOPY TRANSURETHRAL RESECTION OF BLADDER TUMOR  Progress Note    Roberto Cobos  11/8/2017    Pre-op Diagnosis:   Bladder cancer       Post-Op Diagnosis Codes:   Same    Procedure/CPT® Codes:      Procedure(s):  CYSTOSCOPY ,TRANSURETHRAL RESECTION OF BLADDER TUMORs(15) AND FULGuRATION    Surgeon(s):  Bernardino Ray MD    Anesthesia: General    Staff:   Circulator: Suyapa Chinchilla RN  Scrub Person: Carolynn Garberden    Estimated Blood Loss: 20 mL's  Specimens:                  ID Type Source Tests Collected by Time Destination   A : bladder tumor Tissue Urinary Bladder TISSUE EXAM Bernardino Ray MD 11/8/2017 0951          Drains:    None        Findings: Multiple (15) papillary bladder tumors all small   Complications: None, to recovery room stable  Bernardino Ray MD     Date: 11/8/2017  Time: 10:31 AM       In order to meet Medicare requirements, the clinical documentation must support the information cited in the admission order.  Please be sure to provide detailed and clear documentation about the following in the admitting note/history and physical:

## 2023-11-08 ENCOUNTER — TELEPHONE (OUTPATIENT)
Dept: CARDIOLOGY | Facility: CLINIC | Age: 68
End: 2023-11-08
Payer: MEDICARE

## 2023-11-08 RX ORDER — AMLODIPINE BESYLATE 10 MG/1
10 TABLET ORAL DAILY
Qty: 90 TABLET | Refills: 1 | Status: SHIPPED | OUTPATIENT
Start: 2023-11-08

## 2023-11-08 RX ORDER — CLOPIDOGREL BISULFATE 75 MG/1
75 TABLET ORAL DAILY
Qty: 90 TABLET | Refills: 1 | Status: SHIPPED | OUTPATIENT
Start: 2023-11-08

## 2023-11-08 RX ORDER — CARVEDILOL 12.5 MG/1
6.25 TABLET ORAL 2 TIMES DAILY
Qty: 90 TABLET | Refills: 1 | Status: SHIPPED | OUTPATIENT
Start: 2023-11-08

## 2023-11-08 NOTE — TELEPHONE ENCOUNTER
Caller: Era Cobos    Relationship to patient: Emergency Contact    Best call back number: [phone]    Chief complaint: PT NEEDS TO R/S FOR FU AND MEDICATIONS    Type of visit: FU    Requested date: NEXT AVAILABLE     If rescheduling, when is the original appointment: 3.21.23     Additional notes:PT NEEDS APPT AT [facility] OFFICE, NO AVAILABILITY. PLEASE CALL PT BACK WITH APPT.

## 2023-11-14 ENCOUNTER — OFFICE VISIT (OUTPATIENT)
Dept: CARDIOLOGY | Facility: CLINIC | Age: 68
End: 2023-11-14
Payer: MEDICARE

## 2023-11-14 VITALS
OXYGEN SATURATION: 96 % | BODY MASS INDEX: 23.38 KG/M2 | DIASTOLIC BLOOD PRESSURE: 66 MMHG | HEART RATE: 100 BPM | WEIGHT: 167 LBS | SYSTOLIC BLOOD PRESSURE: 130 MMHG | HEIGHT: 71 IN

## 2023-11-14 DIAGNOSIS — I10 ESSENTIAL HYPERTENSION: ICD-10-CM

## 2023-11-14 DIAGNOSIS — I25.118 CORONARY ARTERY DISEASE OF NATIVE ARTERY OF NATIVE HEART WITH STABLE ANGINA PECTORIS: Primary | ICD-10-CM

## 2023-11-14 DIAGNOSIS — E78.5 HYPERLIPIDEMIA LDL GOAL <70: ICD-10-CM

## 2023-11-14 RX ORDER — RANOLAZINE 500 MG/1
500 TABLET, EXTENDED RELEASE ORAL EVERY 12 HOURS
Qty: 180 TABLET | Refills: 3 | Status: SHIPPED | OUTPATIENT
Start: 2023-11-14

## 2023-11-14 RX ORDER — ROSUVASTATIN CALCIUM 40 MG/1
40 TABLET, COATED ORAL DAILY
Qty: 90 TABLET | Refills: 3 | Status: SHIPPED | OUTPATIENT
Start: 2023-11-14

## 2023-11-14 RX ORDER — CLOPIDOGREL BISULFATE 75 MG/1
75 TABLET ORAL DAILY
Qty: 90 TABLET | Refills: 3 | Status: SHIPPED | OUTPATIENT
Start: 2023-11-14

## 2023-11-14 RX ORDER — AMLODIPINE BESYLATE 10 MG/1
10 TABLET ORAL DAILY
Qty: 90 TABLET | Refills: 3 | Status: SHIPPED | OUTPATIENT
Start: 2023-11-14

## 2023-11-14 RX ORDER — CARVEDILOL 12.5 MG/1
12.5 TABLET ORAL 2 TIMES DAILY
Qty: 180 TABLET | Refills: 3 | Status: SHIPPED | OUTPATIENT
Start: 2023-11-14

## 2023-11-14 NOTE — PROGRESS NOTES
[facility]    Encounter Date: 2023    Patient ID: Roberto Cobos is a 68 y.o. male.  : 1955     PCP: Nohemi Maradiaga APRN       Chief Complaint: Dyspnea on exertion      PROBLEM LIST:  Coronary artery disease:  [facility], 2016: 99% stenosis of PLV branch of RCA now s/p 2.25 x 18 mm MARI. Non-occlusive ISR of mid RCA.  of LCx at previous stent site. Mid plaque proximal LAD and moderate nonocclusive plaque of the apical segment of LAD. EF 60%.   Echocardiogram, 2016: Normal diastolic function. EF 70-75%. No valvular problems noted.  [facility], 2019: 75% ISR of mid-RCA now s/p 3.0 x 38 mm MARI post dilated to 3.5 mm reducing the stenosis to no significant residual disease.  of proximal LCx served by left-sided collaterals. Nonobstructive LAD disease and residual nonobstructive proximal an distal RCA disease. Medical management. EF 65%.  Cardiac arrhythmias:  24h Holter, 2019: SR with occasional PVCs, very frequent APCs (26.9%) occasional PAT/SVT, no symptoms reported.  Hypertension  Hyperlipidemia  Bladder cancer:  Hospitalized at [facility] on 16 for hematuria.  Prostate cancer  Tobacco abuse:  1/2 ppd    History of Present Illness  Patient presents today for a 1 year follow-up with a history of coronary artery disease, and cardiac risk factors. Since last visit he has done very well from cardiac standpoint.  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  He is still smoking half to 1 pack of cigarettes a day and experiences occasional shortness of breath which he attributes to lung disease/COPD.   He has discontinued Lipitor due to experiencing side effects.  The patient otherwise denies current chest pain,shortness of breath, orthopnea, palpitations, edema, dizziness, and syncope.       No Known Allergies      Current Outpatient Medications:     albuterol sulfate HFA (Ventolin  HFA) 108 (90 Base) MCG/ACT inhaler, Inhale 2 puffs Every 4 (Four) Hours As Needed for Wheezing or Shortness of Air., Disp: 18 g, Rfl: 5    amLODIPine (NORVASC) 10 MG tablet, Take 1 tablet by mouth Daily., Disp: 90 tablet, Rfl: 3    aspirin 81 MG chewable tablet, Chew 1 tablet., Disp: , Rfl:     carvedilol (COREG) 12.5 MG tablet, Take 1 tablet by mouth 2 (Two) Times a Day., Disp: 180 tablet, Rfl: 3    clopidogrel (PLAVIX) 75 MG tablet, Take 1 tablet by mouth Daily. Need appointment for further refills., Disp: 90 tablet, Rfl: 3    finasteride (PROSCAR) 5 MG tablet, Take 1 tablet by mouth Daily., Disp: , Rfl:     Fluticasone-Umeclidin-Vilant (Trelegy Ellipta) 100-62.5-25 MCG/ACT inhaler, Inhale 1 puff Daily. Rinse mouth with water after use., Disp: 1 each, Rfl: 11    ipratropium-albuterol (DUO-NEB) 0.5-2.5 mg/mL nebulizer, Take 3 mL by nebulization 4 (four) times a day., Disp: 360 mL, Rfl: 0    nitroglycerin (NITROSTAT) 0.4 MG SL tablet, Place 1 tablet under the tongue Every 5 (Five) Minutes As Needed for Chest Pain (has not needed). Take no more than 3 doses in 15 minutes., Disp: 100 tablet, Rfl: 0    predniSONE (DELTASONE) 20 MG tablet, Take 2 tablets by mouth Daily., Disp: 10 tablet, Rfl: 0    ranolazine (RANEXA) 500 MG 12 hr tablet, Take 1 tablet by mouth Every 12 (Twelve) Hours., Disp: 180 tablet, Rfl: 3    rOPINIRole (REQUIP) 0.25 MG tablet, Take 1 tablet by mouth Every Night. Take 1 hour before bedtime., Disp: , Rfl:     tamsulosin (FLOMAX) 0.4 MG capsule 24 hr capsule, Take 1 capsule by mouth., Disp: , Rfl:     rosuvastatin (CRESTOR) 40 MG tablet, Take 1 tablet by mouth Daily., Disp: 90 tablet, Rfl: 3    The following portions of the patient's history were reviewed and updated as appropriate: allergies, current medications, past family history, past medical history, past social history, past surgical history and problem list.    ROS  Review of Systems   14 point ROS negative except for that listed in the HPI.  "        Objective:     /66 (BP Location: Left arm, Patient Position: Sitting)   Pulse 100   Ht 180.3 cm (71\")   Wt 75.8 kg (167 lb)   SpO2 96%   BMI 23.29 kg/m²      Physical Exam  Constitutional: Patient appears well-developed and well-nourished.   HENT: HEENT exam unremarkable.   Neck: Neck supple. No JVD present. No carotid bruits.   Cardiovascular: Normal rate, regular rhythm and normal heart sounds. No murmur heard.   2+ symmetric pulses.   Pulmonary/Chest: Breath sounds normal. Does not exhibit tenderness.   Abdominal: Abdomen benign.   Musculoskeletal: Does not exhibit edema.   Neurological: Neurological exam unremarkable.   Vitals reviewed.    Data Review:   Lab Results   Component Value Date    GLUCOSE 113 (H) 03/07/2023    BUN 19 (H) 03/07/2023    CREATININE 1.26 03/07/2023     03/07/2023    K 4.6 03/07/2023    CO2 27.0 03/07/2023    CALCIUM 9.1 03/07/2023    ALBUMIN 3.6 03/07/2023    AST 19 03/07/2023    ALT 19 03/07/2023     Lab Results   Component Value Date    CHOL 309 (H) 03/07/2023    TRIG 312 (H) 03/07/2023    HDL 45 (L) 03/07/2023     (H) 03/07/2023      Lab Results   Component Value Date    WBC 7.7 03/07/2023    RBC 6.00 03/07/2023    HGB 15.2 03/07/2023    HCT 45.9 03/07/2023    MCV 92 03/07/2023     03/07/2023          Procedures             Assessment:      Diagnosis Plan   1. Coronary artery disease of native artery of native heart with stable angina pectoris  Stable, no current angina, continue dual antiplatelet therapy and current antianginal regimen.    2. Essential hypertension  Well-controlled, continue amlodipine and carvedilol for blood pressure control.   3. Hyperlipidemia LDL goal <70  The patient has discontinued Lipitor due to side effects, the importance of lipid management emphasized, we will start him on Crestor 40 mg daily.  Recheck lipid panel with PCP in 3 months.  In case of persistent dyslipidemia or any side effects to Crestor he will need to " be considered for PCSK9 inhibitors.     4.      Ongoing cigarette smoking.                                    Walking cessation strongly recommended.       Plan:   Stable cardiac status. No current angina or CHF symptoms.  Unmodified risk factors of untreated dyslipidemia with discontinuation of Lipitor and ongoing smoking discussed with the patient.  He was strongly advised to quit smoking.  Start Crestor 40 mg daily, we discussed that he will need repeat labs in 3 months in case of persistent dyslipidemia unable to tolerate Crestor he will need to be considered for PCSK9 inhibitors.  Complete lipid panel before next appointment with PCP.   Continue all other current medications.   FU in 6 MO, sooner as needed.  Thank you for allowing us to participate in the care of your patient.     Scribed for Amarilis Jacome MD by Elizabeth Queen. 11/16/2023 08:42 EST      I, Amarilis Jacome MD, personally performed the services described in this documentation as scribed by the above named individual in my presence, and it is both accurate and complete.  11/16/2023  06:51 EST      Please note that portions of this note may have been completed with a voice recognition program. Efforts were made to edit the dictations, but occasionally words are mistranscribed.

## 2023-12-19 ENCOUNTER — TELEPHONE (OUTPATIENT)
Dept: CARDIOLOGY | Facility: CLINIC | Age: 68
End: 2023-12-19
Payer: MEDICARE

## 2023-12-19 RX ORDER — CARVEDILOL 12.5 MG/1
12.5 TABLET ORAL 2 TIMES DAILY
Qty: 180 TABLET | Refills: 3 | Status: SHIPPED | OUTPATIENT
Start: 2023-12-19

## 2023-12-19 RX ORDER — AMLODIPINE BESYLATE 10 MG/1
10 TABLET ORAL DAILY
Qty: 90 TABLET | Refills: 3 | Status: SHIPPED | OUTPATIENT
Start: 2023-12-19

## 2023-12-19 NOTE — TELEPHONE ENCOUNTER
Patient's wife called to report a concern regarding his blood thinners. She reports that he bleeds very easily and his arms and legs are severely bruised. He is currently taking Plavix and aspirin. She is wondering if he can stop the Plavix. Patient recently saw PCP and had labs drawn, was instructed to contact cardiologist. Labs reportedly normal.    Requesting labs from pcp.     Coronary artery disease:  [facility], 08/16/2016: 99% stenosis of PLV branch of RCA now s/p 2.25 x 18 mm MARI. Non-occlusive ISR of mid RCA.  of LCx at previous stent site. Mid plaque proximal LAD and moderate nonocclusive plaque of the apical segment of LAD. EF 60%.   Echocardiogram, 11/27/2016: Normal diastolic function. EF 70-75%. No valvular problems noted.  [facility], 05/22/2019: 75% ISR of mid-RCA now s/p 3.0 x 38 mm MARI post dilated to 3.5 mm reducing the stenosis to no significant residual disease.  of proximal LCx served by left-sided collaterals. Nonobstructive LAD disease and residual nonobstructive proximal an distal RCA disease. Medical management. EF 65%.  Cardiac arrhythmias:  24h Holter, 06/18/2019: SR with occasional PVCs, very frequent APCs (26.9%) occasional PAT/SVT, no symptoms reported.    Please advise! Thanks,   Love SANTOYO

## 2024-04-04 DIAGNOSIS — F17.210 NICOTINE DEPENDENCE, CIGARETTES, UNCOMPLICATED: ICD-10-CM

## 2024-09-12 NOTE — TELEPHONE ENCOUNTER
Cleared for surgery from cardiac standpoint.  
Letter faxed.  
Patient needs cardiac clearance for bladder tumor removal.  They would like patient to hold asa/plavix seven days prior.  Patient was last seen on 6/13/18.    Please advise.    Lilly  [facility]  P:[phone]  F: [phone]  
n/a

## 2024-09-24 ENCOUNTER — TELEPHONE (OUTPATIENT)
Dept: CARDIOLOGY | Facility: CLINIC | Age: 69
End: 2024-09-24
Payer: MEDICARE

## 2024-10-01 ENCOUNTER — OFFICE VISIT (OUTPATIENT)
Age: 69
End: 2024-10-01
Payer: MEDICARE

## 2024-10-01 VITALS
HEART RATE: 90 BPM | OXYGEN SATURATION: 94 % | BODY MASS INDEX: 24.16 KG/M2 | HEIGHT: 71 IN | SYSTOLIC BLOOD PRESSURE: 140 MMHG | DIASTOLIC BLOOD PRESSURE: 66 MMHG | WEIGHT: 172.6 LBS

## 2024-10-01 DIAGNOSIS — E78.5 DYSLIPIDEMIA: ICD-10-CM

## 2024-10-01 DIAGNOSIS — I25.118 CORONARY ARTERY DISEASE OF NATIVE ARTERY OF NATIVE HEART WITH STABLE ANGINA PECTORIS: Primary | ICD-10-CM

## 2024-10-01 DIAGNOSIS — I10 ESSENTIAL HYPERTENSION: ICD-10-CM

## 2024-10-01 PROCEDURE — 1159F MED LIST DOCD IN RCRD: CPT | Performed by: INTERNAL MEDICINE

## 2024-10-01 PROCEDURE — 1160F RVW MEDS BY RX/DR IN RCRD: CPT | Performed by: INTERNAL MEDICINE

## 2024-10-01 PROCEDURE — 3077F SYST BP >= 140 MM HG: CPT | Performed by: INTERNAL MEDICINE

## 2024-10-01 PROCEDURE — 3078F DIAST BP <80 MM HG: CPT | Performed by: INTERNAL MEDICINE

## 2024-10-01 PROCEDURE — 99214 OFFICE O/P EST MOD 30 MIN: CPT | Performed by: INTERNAL MEDICINE

## 2024-10-01 PROCEDURE — 93000 ELECTROCARDIOGRAM COMPLETE: CPT | Performed by: INTERNAL MEDICINE

## 2024-10-01 RX ORDER — CARVEDILOL 25 MG/1
25 TABLET ORAL 2 TIMES DAILY
Qty: 180 TABLET | Refills: 3 | Status: SHIPPED | OUTPATIENT
Start: 2024-10-01

## 2024-10-01 RX ORDER — GABAPENTIN 300 MG/1
300 CAPSULE ORAL 3 TIMES DAILY
COMMUNITY

## 2024-10-01 RX ORDER — ATORVASTATIN CALCIUM 40 MG/1
1 TABLET, FILM COATED ORAL DAILY
COMMUNITY

## 2024-10-01 NOTE — PROGRESS NOTES
[facility]    Encounter Date: 2024    Patient ID: Roberto Cobos is a 68 y.o. male.  : 1955     PCP: Nohemi Maradiaga APRN       Chief Complaint: No chief complaint on file.      PROBLEM LIST:  Coronary artery disease:  [facility], 2016: 99% stenosis of PLV branch of RCA now s/p 2.25 x 18 mm MARI. Non-occlusive ISR of mid RCA.  of LCx at previous stent site. Mid plaque proximal LAD and moderate nonocclusive plaque of the apical segment of LAD. EF 60%.   Echocardiogram, 2016: Normal diastolic function. EF 70-75%. No valvular problems noted.  [facility], 2019: 75% ISR of mid-RCA now s/p 3.0 x 38 mm MARI post dilated to 3.5 mm reducing the stenosis to no significant residual disease.  of proximal LCx served by left-sided collaterals. Nonobstructive LAD disease and residual nonobstructive proximal an distal RCA disease. Medical management. EF 65%.  Cardiac arrhythmias:  24h Holter, 2019: SR with occasional PVCs, very frequent APCs (26.9%) occasional PAT/SVT, no symptoms reported.  Hypertension  Hyperlipidemia  Bladder cancer:  Hospitalized at [facility] on 16 for hematuria.  Prostate cancer  Tobacco abuse:  1/2 ppd    History of Present Illness  Patient presents today for a follow-up with a history of CAD and cardiac risk factors. Since last visit, he has done quite well from cardiac standpoint.  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  States that he rarely has a left-sided chest discomfort after exertion, he sits down to rest and the discomfort goes away.  He has not required any nitroglycerin.  He still smoking 1 pack of cigarettes a day and finds it hard to quit.  No interim ER visits or hospitalizations.    No Known Allergies      Current Outpatient Medications:   •  albuterol sulfate HFA (Ventolin HFA) 108 (90 Base) MCG/ACT inhaler, Inhale 2 puffs Every 4 (Four) Hours As Needed for Wheezing or Shortness  of Air., Disp: 18 g, Rfl: 5  •  amLODIPine (NORVASC) 10 MG tablet, Take 1 tablet by mouth Daily., Disp: 90 tablet, Rfl: 3  •  aspirin 81 MG chewable tablet, Chew 1 tablet., Disp: , Rfl:   •  carvedilol (COREG) 12.5 MG tablet, Take 1 tablet by mouth 2 (Two) Times a Day., Disp: 180 tablet, Rfl: 3  •  clopidogrel (PLAVIX) 75 MG tablet, Take 1 tablet by mouth Daily. Need appointment for further refills., Disp: 90 tablet, Rfl: 3  •  finasteride (PROSCAR) 5 MG tablet, Take 1 tablet by mouth Daily., Disp: , Rfl:   •  Fluticasone-Umeclidin-Vilant (Trelegy Ellipta) 100-62.5-25 MCG/ACT inhaler, Inhale 1 puff Daily. Rinse mouth with water after use., Disp: 1 each, Rfl: 11  •  ipratropium-albuterol (DUO-NEB) 0.5-2.5 mg/mL nebulizer, Take 3 mL by nebulization 4 (four) times a day., Disp: 360 mL, Rfl: 0  •  nitroglycerin (NITROSTAT) 0.4 MG SL tablet, Place 1 tablet under the tongue Every 5 (Five) Minutes As Needed for Chest Pain (has not needed). Take no more than 3 doses in 15 minutes., Disp: 100 tablet, Rfl: 0  •  predniSONE (DELTASONE) 20 MG tablet, Take 2 tablets by mouth Daily., Disp: 10 tablet, Rfl: 0  •  ranolazine (RANEXA) 500 MG 12 hr tablet, Take 1 tablet by mouth Every 12 (Twelve) Hours., Disp: 180 tablet, Rfl: 3  •  rOPINIRole (REQUIP) 0.25 MG tablet, Take 1 tablet by mouth Every Night. Take 1 hour before bedtime., Disp: , Rfl:   •  rosuvastatin (CRESTOR) 40 MG tablet, Take 1 tablet by mouth Daily., Disp: 90 tablet, Rfl: 3  •  tamsulosin (FLOMAX) 0.4 MG capsule 24 hr capsule, Take 1 capsule by mouth., Disp: , Rfl:     The following portions of the patient's history were reviewed and updated as appropriate: allergies, current medications, past family history, past medical history, past social history, past surgical history and problem list.    ROS  Review of Systems   14 point ROS negative except for that listed in the HPI.         Objective:     There were no vitals taken for this visit.     Physical  Exam  Constitutional: Patient appears well-developed and well-nourished.   HENT: HEENT exam unremarkable.   Neck: Neck supple. No JVD present. No carotid bruits.   Cardiovascular: Normal rate, regular rhythm and normal heart sounds. No murmur heard.   2+ symmetric pulses.   Pulmonary/Chest: Breath sounds normal. Does not exhibit tenderness.   Abdominal: Abdomen benign.   Musculoskeletal: Does not exhibit edema.   Neurological: Neurological exam unremarkable.   Vitals reviewed.    Data Review:   Lab date: 12/18/2023  CMP: Glu 151, BUN 25, Creat 1.21, eGFR >60, Na 136, K 4.4, Cl 101, CO2 28, Ca 9.2, Alk Phos 67, AST 16, ALT 33  CBC: WBC 13, RBC 4.66, HGB 15.1, HCT 43.2, MCV 93, MCH 32.4,   HbA1c: 7         ECG 12 Lead    Date/Time: 10/1/2024 10:11 AM  Performed by: Amarilis Jacome MD    Authorized by: Amarilis Jacome MD  Comparison: compared with previous ECG from 2/2/2022  Similar to previous ECG  Rhythm: sinus rhythm  Comments: Occasional PVCs, no significant ST changes           Advance Care Planning   ACP discussion was declined by the patient. Patient does not have an advance directive, declines further assistance.           Assessment:      Diagnosis Plan   1. Coronary artery disease involving native coronary artery of native heart without angina pectoris  Stable on current medical therapy.  Recommended Cardiolite stress test, patient is reluctant due to side effects, states that he is overall stable and will notify us if his symptoms worsen, increasing dose of carvedilol to 25 mg twice daily for better control of heart rate/blood pressure and angina.        2. Essential hypertension  Overall stable and on the higher side, I will increase carvedilol to 25 mg twice daily, continue rest of the current medications.      3. Dyslipidemia  Followed by PCP, continue Lipitor at current dose.   4.       Smoking                                                              cessation recommended, counseled for more than  3 minutes.       Plan:   Stable cardiac status.  Occasional to minimal angina.  Patient feels stable overall and does not wish to have a stress test.  We will increase carvedilol to 25 mg twice daily for better control of angina and hypertension.  Smoking cessation strongly recommended, patient counseled for more than 3 minutes.  Continue rest of the current medications.   FU in 6 MO, sooner as needed.  Thank you for allowing us to participate in the care of your patient.       Amarilis Jacome MD, FACC, [facility]        Please note that portions of this note may have been completed with a voice recognition program. Efforts were made to edit the dictations, but occasionally words are mistranscribed.

## 2025-01-07 RX ORDER — RANOLAZINE 500 MG/1
500 TABLET, EXTENDED RELEASE ORAL EVERY 12 HOURS
Qty: 180 TABLET | Refills: 0 | Status: SHIPPED | OUTPATIENT
Start: 2025-01-07

## 2025-01-07 RX ORDER — AMLODIPINE BESYLATE 10 MG/1
10 TABLET ORAL DAILY
Qty: 90 TABLET | Refills: 0 | Status: SHIPPED | OUTPATIENT
Start: 2025-01-07

## 2025-03-11 RX ORDER — AMLODIPINE BESYLATE 10 MG/1
10 TABLET ORAL DAILY
Qty: 90 TABLET | Refills: 0 | Status: SHIPPED | OUTPATIENT
Start: 2025-03-11

## 2025-03-21 RX ORDER — AMLODIPINE BESYLATE 10 MG/1
10 TABLET ORAL DAILY
Qty: 90 TABLET | Refills: 3 | Status: SHIPPED | OUTPATIENT
Start: 2025-03-21

## 2025-03-25 ENCOUNTER — TELEPHONE (OUTPATIENT)
Dept: CARDIOLOGY | Facility: CLINIC | Age: 70
End: 2025-03-25
Payer: MEDICARE

## 2025-03-25 NOTE — TELEPHONE ENCOUNTER
Caller: Era Cobos    Relationship to patient: Emergency Contact    Best call back number: [phone]    Chief complaint: PT IS SCHEDULED FOR 04.01.25 IN [place]. PT WIFE CALLED IN [facility]. SHE SAID THEY USUALLY ONLY FOLLOWED UP YEARLY IN THE [facility] OFFICE AND HER  IS SCHEDULED 6 MONTHS IN THE [place] OFFICE. SHE WOULD LIKE TO SPEAK TO SOMEONE TO SEE IF THIS IS NECESSARY OR IF IT SHOULD BE RESCHEDULED. PLEASE REACH OUT TO THE PT WIFE TO ADDRESS THIS.     Type of visit: FOLLOW UP    Requested date: ASAP     If rescheduling, when is the original appointment: 04.01.25     Additional notes: NO NEW OR WORSENING MEDICAL CONCERN

## 2025-05-06 NOTE — PROGRESS NOTES
[facility]    Encounter Date: 2025    Patient ID: Roberto Cobos is a 69 y.o. male.  : 1955     PCP: Nohemi Maradiaga APRN       Chief Complaint: Coronary Artery Disease (Coronary artery disease of native artery of native heart with stable angina pectoris//)      PROBLEM LIST:  Coronary artery disease:  [facility], 2016: 99% stenosis of PLV branch of RCA now s/p 2.25 x 18 mm MARI. Non-occlusive ISR of mid RCA.  of LCx at previous stent site. Mid plaque proximal LAD and moderate nonocclusive plaque of the apical segment of LAD. EF 60%.   Echocardiogram, 2016: Normal diastolic function. EF 70-75%. No valvular problems noted.  [facility], 2019: 75% ISR of mid-RCA now s/p 3.0 x 38 mm MARI post dilated to 3.5 mm reducing the stenosis to no significant residual disease.  of proximal LCx served by left-sided collaterals. Nonobstructive LAD disease and residual nonobstructive proximal an distal RCA disease. Medical management. EF 65%.  CT angiogram, 2024: Coronary artery disease is present.   Cardiac arrhythmias:  24h Holter, 2019: SR with occasional PVCs, very frequent APCs (26.9%) occasional PAT/SVT, no symptoms reported.  Hypertension  Hyperlipidemia  Bladder cancer:  Hospitalized at [facility] on 16 for hematuria.  Prostate cancer  Tobacco abuse:  1/2 ppd    History of Present Illness  Patient presents today for a follow-up with a history of CAD and cardiac risk factors. Since last visit, Patient has been doing well from a cardiac standpoint.  Active and busy in daily life but does not have a regular exercise routine.  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 Patient denies any chest pain, progressive shortness of air, palpitations, orthopnea, presyncope or syncope. Some mild LE edema in the evenings. He had been off of his cholesterol medication as his pill bottle was not with the rest. Resumed statin  approximately 2 week ago. Reports that PCP sent in lipitor and he prefers crestor.       No Known Allergies      Current Outpatient Medications:     albuterol sulfate HFA (Ventolin HFA) 108 (90 Base) MCG/ACT inhaler, Inhale 2 puffs Every 4 (Four) Hours As Needed for Wheezing or Shortness of Air., Disp: 18 g, Rfl: 5    amLODIPine (NORVASC) 10 MG tablet, Take 1 tablet by mouth Daily., Disp: 90 tablet, Rfl: 3    aspirin 81 MG chewable tablet, Chew 1 tablet., Disp: , Rfl:     carvedilol (COREG) 25 MG tablet, Take 1 tablet by mouth 2 (Two) Times a Day., Disp: 180 tablet, Rfl: 3    finasteride (PROSCAR) 5 MG tablet, Take 1 tablet by mouth Daily., Disp: , Rfl:     Fluticasone-Umeclidin-Vilant (Trelegy Ellipta) 100-62.5-25 MCG/ACT inhaler, Inhale 1 puff Daily. Rinse mouth with water after use., Disp: 1 each, Rfl: 11    ipratropium-albuterol (DUO-NEB) 0.5-2.5 mg/mL nebulizer, Take 3 mL by nebulization 4 (four) times a day., Disp: 360 mL, Rfl: 0    metFORMIN ER (GLUCOPHAGE-XR) 500 MG 24 hr tablet, Take 1 tablet by mouth 2 (Two) Times a Day. (Patient taking differently: Take 1 tablet by mouth 2 (Two) Times a Day. Taking once a week), Disp: , Rfl:     nitroglycerin (NITROSTAT) 0.4 MG SL tablet, Place 1 tablet under the tongue Every 5 (Five) Minutes As Needed for Chest Pain (has not needed). Take no more than 3 doses in 15 minutes., Disp: 100 tablet, Rfl: 0    predniSONE (DELTASONE) 20 MG tablet, Take 2 tablets by mouth Daily., Disp: 10 tablet, Rfl: 0    ranolazine (RANEXA) 500 MG 12 hr tablet, TAKE 1 TABLET BY MOUTH EVERY 12 (TWELVE) HOURS., Disp: 180 tablet, Rfl: 0    rosuvastatin (CRESTOR) 40 MG tablet, Take 1 tablet by mouth Daily., Disp: 90 tablet, Rfl: 3    tamsulosin (FLOMAX) 0.4 MG capsule 24 hr capsule, Take 1 capsule by mouth., Disp: , Rfl:     The following portions of the patient's history were reviewed and updated as appropriate: allergies, current medications, past family history, past medical history, past social  "history, past surgical history and problem list.    ROS  Review of Systems   14 point ROS negative except for that listed in the HPI.         Objective:     /82 (BP Location: Right arm, Patient Position: Sitting, Cuff Size: Adult)   Pulse 84   Ht 180.3 cm (71\")   Wt 78.9 kg (174 lb)   SpO2 96%   BMI 24.27 kg/m²      Physical Exam  Constitutional: Patient appears well-developed and well-nourished.   Neck: Neck supple. No JVD present.   Cardiovascular: Normal rate, regular rhythm and normal heart sounds. No murmur heard.   2+ symmetric pulses.   Pulmonary/Chest: Breath sounds normal. Does not exhibit tenderness.   Abdominal: Abdomen benign.   Musculoskeletal: Does not exhibit edema.   Vitals reviewed.    Data Review:     Lab date: 06/12/2024  CMP: Glu 171, BUN 21, Creat 1.35, eGFR 57, Na 140, K 3.8, Cl 105, CO2 26, Ca 8.3, Alk Phos 59, AST 15, ALT 24  CBC: WBC 8.4, RBC 4.37, HGB 14.0, HCT 41.2, MCV 94, MCH 32.0,      Procedures     Advance Care Planning   ACP discussion was held with the patient during this visit. Patient does not have an advance directive, declines further assistance.           Assessment and plan:      Diagnosis Plan   1. Coronary artery disease involving native coronary artery of native heart without angina pectoris  Stable without current angina.  Continue aspirin for antiplatelet therapy.  Continue current statin therapy.         2. Essential hypertension  Well controlled. Continue current antihypertensive therapy.       3. Dyslipidemia  Uncontrolled. Had been off of statin therapy due to misplaced statin medication bottle. Was restarted on lipitor by PCP but will discontinue this and add crestor per patient request. Repeat FLP at follow up to reassess.       4. Tobacco use  Cessation          Continue current medications.   FU in 6 MO, sooner as needed.  Thank you for allowing us to participate in the care of your patient.         Mariluz Nava PA-C      Please note that " portions of this note may have been completed with a voice recognition program. Efforts were made to edit the dictations, but occasionally words are mistranscribed.

## 2025-05-13 ENCOUNTER — OFFICE VISIT (OUTPATIENT)
Dept: CARDIOLOGY | Facility: CLINIC | Age: 70
End: 2025-05-13
Payer: MEDICARE

## 2025-05-13 VITALS
SYSTOLIC BLOOD PRESSURE: 130 MMHG | BODY MASS INDEX: 24.36 KG/M2 | HEART RATE: 84 BPM | OXYGEN SATURATION: 96 % | HEIGHT: 71 IN | WEIGHT: 174 LBS | DIASTOLIC BLOOD PRESSURE: 82 MMHG

## 2025-05-13 DIAGNOSIS — E78.5 DYSLIPIDEMIA: ICD-10-CM

## 2025-05-13 DIAGNOSIS — I10 ESSENTIAL HYPERTENSION: ICD-10-CM

## 2025-05-13 DIAGNOSIS — Z72.0 TOBACCO USE: ICD-10-CM

## 2025-05-13 DIAGNOSIS — I25.10 CORONARY ARTERY DISEASE INVOLVING NATIVE CORONARY ARTERY OF NATIVE HEART WITHOUT ANGINA PECTORIS: Primary | ICD-10-CM

## 2025-05-13 RX ORDER — METFORMIN HYDROCHLORIDE 500 MG/1
500 TABLET, EXTENDED RELEASE ORAL 2 TIMES DAILY
COMMUNITY
Start: 2024-10-31 | End: 2025-10-31

## 2025-05-13 RX ORDER — ROSUVASTATIN CALCIUM 40 MG/1
40 TABLET, COATED ORAL DAILY
Qty: 90 TABLET | Refills: 3 | Status: SHIPPED | OUTPATIENT
Start: 2025-05-13

## 2025-05-13 RX ORDER — ROSUVASTATIN CALCIUM 40 MG/1
40 TABLET, COATED ORAL DAILY
COMMUNITY
Start: 2025-04-24 | End: 2025-05-13 | Stop reason: SDUPTHER

## (undated) DEVICE — AIRWY SZ11

## (undated) DEVICE — PK CYSTO-TUR BASIC 10

## (undated) DEVICE — GW LUGE .014 182 CM

## (undated) DEVICE — CATH DIAG EXPO M/ PK 5F FL4/FR4 PIG

## (undated) DEVICE — INTRO SHEATH PRELUDE IDEAL SPRNG COIL 021 6F 23X80CM

## (undated) DEVICE — GLV SURG SENSICARE W/ALOE PF LF 7.5 STRL

## (undated) DEVICE — PAD GRND REM POLYHESIVE A/ DISP

## (undated) DEVICE — NC TREK CORONARY DILATATION CATHETER 3.50 MM X 15 MM / OVER-THE-WIRE: Brand: NC TREK

## (undated) DEVICE — CATH DIAG EXPO .045 FL3  5F 100CM

## (undated) DEVICE — GUIDE CATHETER: Brand: MACH1™

## (undated) DEVICE — DEV INFL MONARCH 25W

## (undated) DEVICE — CANNULA,OXY,ADULT,SUPERSOFT,W/7'TUB,UC: Brand: MEDLINE

## (undated) DEVICE — MEDI-VAC NON-CONDUCTIVE SUCTION TUBING: Brand: CARDINAL HEALTH

## (undated) DEVICE — NDL HYPO ECLPS SFTY 18G 1 1/2IN

## (undated) DEVICE — MEDI-VAC YANKAUER SUCTION HANDLE W/BULBOUS TIP: Brand: CARDINAL HEALTH

## (undated) DEVICE — MODEL BT2000 P/N 700287-012KIT CONTENTS: MANIFOLD WITH SALINE AND CONTRAST PORTS, SALINE TUBING WITH SPIKE AND HAND SYRINGE, TRANSDUCER: Brand: BT2000 AUTOMATED MANIFOLD KIT

## (undated) DEVICE — GW INQWIRE FC PTFE STD J/1.5 .035 260

## (undated) DEVICE — PK CATH CARD 10

## (undated) DEVICE — MODEL AT P65, P/N 701554-001KIT CONTENTS: HAND CONTROLLER, 3-WAY HIGH-PRESSURE STOPCOCK WITH ROTATING END AND PREMIUM HIGH-PRESSURE TUBING: Brand: ANGIOTOUCH® KIT

## (undated) DEVICE — NC TREK CORONARY DILATATION CATHETER 2.50 MM X 12 MM / OVER-THE-WIRE: Brand: NC TREK

## (undated) DEVICE — DEV COMP RAD PRELUDESYNC 24CM

## (undated) DEVICE — CORD BOVIE 1P/U

## (undated) DEVICE — ELECTRD ROLL BALL RESECTOSCP ANG